# Patient Record
Sex: FEMALE | Race: WHITE | NOT HISPANIC OR LATINO | Employment: UNEMPLOYED | URBAN - METROPOLITAN AREA
[De-identification: names, ages, dates, MRNs, and addresses within clinical notes are randomized per-mention and may not be internally consistent; named-entity substitution may affect disease eponyms.]

---

## 2018-04-06 ENCOUNTER — HOSPITAL ENCOUNTER (EMERGENCY)
Facility: MEDICAL CENTER | Age: 28
End: 2018-04-06
Attending: EMERGENCY MEDICINE
Payer: MEDICAID

## 2018-04-06 ENCOUNTER — APPOINTMENT (OUTPATIENT)
Dept: RADIOLOGY | Facility: MEDICAL CENTER | Age: 28
End: 2018-04-06
Attending: EMERGENCY MEDICINE
Payer: MEDICAID

## 2018-04-06 VITALS
DIASTOLIC BLOOD PRESSURE: 61 MMHG | OXYGEN SATURATION: 97 % | HEART RATE: 85 BPM | HEIGHT: 66 IN | WEIGHT: 167.55 LBS | TEMPERATURE: 98 F | SYSTOLIC BLOOD PRESSURE: 100 MMHG | RESPIRATION RATE: 16 BRPM | BODY MASS INDEX: 26.93 KG/M2

## 2018-04-06 DIAGNOSIS — N30.00 ACUTE CYSTITIS WITHOUT HEMATURIA: ICD-10-CM

## 2018-04-06 LAB
ALBUMIN SERPL BCP-MCNC: 4.1 G/DL (ref 3.2–4.9)
ALBUMIN/GLOB SERPL: 1.5 G/DL
ALP SERPL-CCNC: 21 U/L (ref 30–99)
ALT SERPL-CCNC: 13 U/L (ref 2–50)
ANION GAP SERPL CALC-SCNC: 7 MMOL/L (ref 0–11.9)
APPEARANCE UR: CLEAR
AST SERPL-CCNC: 18 U/L (ref 12–45)
BACTERIA #/AREA URNS HPF: ABNORMAL /HPF
BASOPHILS # BLD AUTO: 0.7 % (ref 0–1.8)
BASOPHILS # BLD: 0.05 K/UL (ref 0–0.12)
BILIRUB SERPL-MCNC: 0.7 MG/DL (ref 0.1–1.5)
BILIRUB UR QL STRIP.AUTO: NEGATIVE
BUN SERPL-MCNC: 7 MG/DL (ref 8–22)
CALCIUM SERPL-MCNC: 9.3 MG/DL (ref 8.4–10.2)
CHLORIDE SERPL-SCNC: 102 MMOL/L (ref 96–112)
CO2 SERPL-SCNC: 23 MMOL/L (ref 20–33)
COLOR UR: YELLOW
CREAT SERPL-MCNC: 0.77 MG/DL (ref 0.5–1.4)
CULTURE IF INDICATED INDCX: YES UA CULTURE
EOSINOPHIL # BLD AUTO: 0.13 K/UL (ref 0–0.51)
EOSINOPHIL NFR BLD: 1.7 % (ref 0–6.9)
EPI CELLS #/AREA URNS HPF: ABNORMAL /HPF
ERYTHROCYTE [DISTWIDTH] IN BLOOD BY AUTOMATED COUNT: 33.1 FL (ref 35.9–50)
GLOBULIN SER CALC-MCNC: 2.7 G/DL (ref 1.9–3.5)
GLUCOSE SERPL-MCNC: 84 MG/DL (ref 65–99)
GLUCOSE UR STRIP.AUTO-MCNC: NEGATIVE MG/DL
HCT VFR BLD AUTO: 35.4 % (ref 37–47)
HGB BLD-MCNC: 11.5 G/DL (ref 12–16)
IMM GRANULOCYTES # BLD AUTO: 0.02 K/UL (ref 0–0.11)
IMM GRANULOCYTES NFR BLD AUTO: 0.3 % (ref 0–0.9)
KETONES UR STRIP.AUTO-MCNC: NEGATIVE MG/DL
LEUKOCYTE ESTERASE UR QL STRIP.AUTO: ABNORMAL
LIPASE SERPL-CCNC: 19 U/L (ref 7–58)
LYMPHOCYTES # BLD AUTO: 2.74 K/UL (ref 1–4.8)
LYMPHOCYTES NFR BLD: 36.5 % (ref 22–41)
MCH RBC QN AUTO: 20.7 PG (ref 27–33)
MCHC RBC AUTO-ENTMCNC: 32.5 G/DL (ref 33.6–35)
MCV RBC AUTO: 63.7 FL (ref 81.4–97.8)
MICRO URNS: ABNORMAL
MONOCYTES # BLD AUTO: 0.59 K/UL (ref 0–0.85)
MONOCYTES NFR BLD AUTO: 7.9 % (ref 0–13.4)
NEUTROPHILS # BLD AUTO: 3.98 K/UL (ref 2–7.15)
NEUTROPHILS NFR BLD: 52.9 % (ref 44–72)
NITRITE UR QL STRIP.AUTO: NEGATIVE
NRBC # BLD AUTO: 0 K/UL
NRBC BLD-RTO: 0 /100 WBC
PH UR STRIP.AUTO: 6 [PH]
PLATELET # BLD AUTO: 278 K/UL (ref 164–446)
PMV BLD AUTO: 10.6 FL (ref 9–12.9)
POTASSIUM SERPL-SCNC: 3.5 MMOL/L (ref 3.6–5.5)
PROT SERPL-MCNC: 6.8 G/DL (ref 6–8.2)
PROT UR QL STRIP: NEGATIVE MG/DL
RBC # BLD AUTO: 5.56 M/UL (ref 4.2–5.4)
RBC # URNS HPF: ABNORMAL /HPF
RBC UR QL AUTO: ABNORMAL
SODIUM SERPL-SCNC: 132 MMOL/L (ref 135–145)
SP GR UR STRIP.AUTO: <=1.005
WBC # BLD AUTO: 7.5 K/UL (ref 4.8–10.8)
WBC #/AREA URNS HPF: ABNORMAL /HPF

## 2018-04-06 PROCEDURE — 83690 ASSAY OF LIPASE: CPT

## 2018-04-06 PROCEDURE — 99284 EMERGENCY DEPT VISIT MOD MDM: CPT

## 2018-04-06 PROCEDURE — 76817 TRANSVAGINAL US OBSTETRIC: CPT

## 2018-04-06 PROCEDURE — 76705 ECHO EXAM OF ABDOMEN: CPT

## 2018-04-06 PROCEDURE — 700102 HCHG RX REV CODE 250 W/ 637 OVERRIDE(OP): Performed by: EMERGENCY MEDICINE

## 2018-04-06 PROCEDURE — A9270 NON-COVERED ITEM OR SERVICE: HCPCS | Performed by: EMERGENCY MEDICINE

## 2018-04-06 PROCEDURE — 87086 URINE CULTURE/COLONY COUNT: CPT

## 2018-04-06 PROCEDURE — 85025 COMPLETE CBC W/AUTO DIFF WBC: CPT

## 2018-04-06 PROCEDURE — 81001 URINALYSIS AUTO W/SCOPE: CPT

## 2018-04-06 PROCEDURE — 80053 COMPREHEN METABOLIC PANEL: CPT

## 2018-04-06 PROCEDURE — 76700 US EXAM ABDOM COMPLETE: CPT

## 2018-04-06 PROCEDURE — 36415 COLL VENOUS BLD VENIPUNCTURE: CPT

## 2018-04-06 RX ORDER — CEPHALEXIN 500 MG/1
500 CAPSULE ORAL 3 TIMES DAILY
Qty: 21 CAP | Refills: 0 | Status: SHIPPED | OUTPATIENT
Start: 2018-04-06 | End: 2018-04-13

## 2018-04-06 RX ORDER — CEPHALEXIN 250 MG/1
500 CAPSULE ORAL ONCE
Status: COMPLETED | OUTPATIENT
Start: 2018-04-06 | End: 2018-04-06

## 2018-04-06 RX ADMIN — CEPHALEXIN 500 MG: 250 CAPSULE ORAL at 19:26

## 2018-04-06 ASSESSMENT — PAIN SCALES - GENERAL: PAINLEVEL_OUTOF10: 4

## 2018-04-06 NOTE — ED NOTES
"Pt reports a hx of pregnancy for the past 10 weeks, and \"godwin-uterine blood clots\" prior to her pregnancy and on Lovenox twice per day since her diagnosis.  She presents complaining of RLQ abdominal pain and flank pain.   "

## 2018-04-06 NOTE — ED NOTES
Patient 9weeks 6days pregnant. Was seen two weeks ago in [place] and told he had a clot in her Periuterine vein. Placed on lovenox injections which she has been using. Today the rt lower/mid abd quadrant has as much pain as before and now she also has rt flank paon, no burning with urination. Denies bleeding.

## 2018-04-07 NOTE — ED PROVIDER NOTES
ED Provider Note    ED Provider Note      Primary care provider: No primary care provider on file.    CHIEF COMPLAINT  Chief Complaint   Patient presents with   • Pregnancy   • RLQ Pain   • Flank Pain       HPI  Francois Quintero is a 27 y.o. female who presents to the Emergency Department with chief complaint of right lower quadrant abdominal pain. Patient has recent history of periuterine vein thrombosis that she was diagnosed with by ultrasound 2 weeks prior at outside hospital. She had follow-up appointment with obstetrics and gynecology who performed MRI that failed to demonstrate the periuterine vein thrombosis. Since then she reports that her pain is gone slightly worse. Has not radiated into her right lower quadrant into the right flank. She currently rates it as a 6 out of 10 and states it's worse when she is up walking was also worse on the ride here tonight. She also reports that she had an abnormal bruise over the right lower quadrant of her abdomen previously states that this is lightening up now. After diagnosis of the periuterine vein thrombosis she was placed on Lovenox which is continue to take is hesitant to take this without firm diagnosis of the blood clot. She's had no abnormal vaginal bleeding or discharge no constipation or diarrhea no melena no hematochezia minor nausea no vomiting no fevers or chills no headache altered mental status cough congestion chest pain or shortness of breath.    REVIEW OF SYSTEMS  10 systems reviewed and otherwise negative, pertinent positives and negatives listed in the history of present illness.    PAST MEDICAL HISTORY     Periuterine vein thrombosis    SURGICAL HISTORY  patient denies any surgical history    SOCIAL HISTORY  Social History   Substance Use Topics   • Smoking status: Never Smoker   • Smokeless tobacco: Never Used   • Alcohol use No      History   Drug Use No       FAMILY HISTORY  Non-Contributory    CURRENT MEDICATIONS  Home Medications      "Reviewed by Dea Nicole PhT (Pharmacy Tech) on 04/06/18 at 1654  Med List Status: Complete   Medication Last Dose Status   enoxaparin (LOVENOX) 80 MG/0.8ML Solution inj 4/6/2018 Active   Prenatal Vit-Fe Fumarate-FA (PRENATAL PO) 4/6/2018 Active                ALLERGIES  No Known Allergies    PHYSICAL EXAM  VITAL SIGNS: /82   Pulse 80   Temp 36.7 °C (98 °F)   Resp 18   Ht 1.676 m (5' 6\") Comment: Stated  Wt 76 kg (167 lb 8.8 oz)   LMP 01/26/2018   SpO2 98%   BMI 27.04 kg/m²   Pulse ox interpretation: I interpret this pulse ox as normal.  Constitutional: Alert and oriented x 3, minimal Distress  HEENT: Atraumatic normocephalic, pupils are equal round reactive to light extraocular movements are intact. The nares is clear, external ears are normal, mouth shows moist mucous membranes  Neck: Supple, no JVD no tracheal deviation  Cardiovascular: Regular rate and rhythm no murmur rub or gallop 2+ pulses peripherally x4  Thorax & Lungs: No respiratory distress, no wheezes rales or rhonchi, No chest tenderness.   GI: Minimal tenderness in the right lower quadrant no rebound or guarding no other focal tenderness no right flank tenderness or CVA tenderness positive bowel sounds nondistended  : Deferred  Rectal: Deferred  Skin: Very faint ecchymosis over the right lower quadrant  Musculoskeletal: Moving all extremities with full range and 5 of 5 strength, no acute  deformity  Neurologic: Cranial nerves III through XII are grossly intact, no sensory deficit, no cerebellar dysfunction   Psychiatric: Appropriate affect for situation at this time      DIAGNOSTIC STUDIES / PROCEDURES  LABS      Results for orders placed or performed during the hospital encounter of 04/06/18   CBC WITH DIFFERENTIAL   Result Value Ref Range    WBC 7.5 4.8 - 10.8 K/uL    RBC 5.56 (H) 4.20 - 5.40 M/uL    Hemoglobin 11.5 (L) 12.0 - 16.0 g/dL    Hematocrit 35.4 (L) 37.0 - 47.0 %    MCV 63.7 (L) 81.4 - 97.8 fL    MCH 20.7 (L) 27.0 - 33.0 " pg    MCHC 32.5 (L) 33.6 - 35.0 g/dL    RDW 33.1 (L) 35.9 - 50.0 fL    Platelet Count 278 164 - 446 K/uL    MPV 10.6 9.0 - 12.9 fL    Neutrophils-Polys 52.90 44.00 - 72.00 %    Lymphocytes 36.50 22.00 - 41.00 %    Monocytes 7.90 0.00 - 13.40 %    Eosinophils 1.70 0.00 - 6.90 %    Basophils 0.70 0.00 - 1.80 %    Immature Granulocytes 0.30 0.00 - 0.90 %    Nucleated RBC 0.00 /100 WBC    Neutrophils (Absolute) 3.98 2.00 - 7.15 K/uL    Lymphs (Absolute) 2.74 1.00 - 4.80 K/uL    Monos (Absolute) 0.59 0.00 - 0.85 K/uL    Eos (Absolute) 0.13 0.00 - 0.51 K/uL    Baso (Absolute) 0.05 0.00 - 0.12 K/uL    Immature Granulocytes (abs) 0.02 0.00 - 0.11 K/uL    NRBC (Absolute) 0.00 K/uL   COMP METABOLIC PANEL   Result Value Ref Range    Sodium 132 (L) 135 - 145 mmol/L    Potassium 3.5 (L) 3.6 - 5.5 mmol/L    Chloride 102 96 - 112 mmol/L    Co2 23 20 - 33 mmol/L    Anion Gap 7.0 0.0 - 11.9    Glucose 84 65 - 99 mg/dL    Bun 7 (L) 8 - 22 mg/dL    Creatinine 0.77 0.50 - 1.40 mg/dL    Calcium 9.3 8.4 - 10.2 mg/dL    AST(SGOT) 18 12 - 45 U/L    ALT(SGPT) 13 2 - 50 U/L    Alkaline Phosphatase 21 (L) 30 - 99 U/L    Total Bilirubin 0.7 0.1 - 1.5 mg/dL    Albumin 4.1 3.2 - 4.9 g/dL    Total Protein 6.8 6.0 - 8.2 g/dL    Globulin 2.7 1.9 - 3.5 g/dL    A-G Ratio 1.5 g/dL   LIPASE   Result Value Ref Range    Lipase 19 7 - 58 U/L   URINALYSIS,CULTURE IF INDICATED   Result Value Ref Range    Color Yellow     Character Clear     Specific Gravity <=1.005 <1.035    Ph 6.0 5.0 - 8.0    Glucose Negative Negative mg/dL    Ketones Negative Negative mg/dL    Protein Negative Negative mg/dL    Bilirubin Negative Negative    Nitrite Negative Negative    Leukocyte Esterase Small (A) Negative    Occult Blood Trace (A) Negative    Micro Urine Req Microscopic     Culture Indicated Yes UA Culture   URINE MICROSCOPIC (W/UA)   Result Value Ref Range    WBC 5-10 (A) /hpf    RBC 2-5 (A) /hpf    Bacteria Moderate (A) None /hpf    Epithelial Cells Few Few /hpf    ESTIMATED GFR   Result Value Ref Range    GFR If African American >60 >60 mL/min/1.73 m 2    GFR If Non African American >60 >60 mL/min/1.73 m 2       All labs reviewed by me.        RADIOLOGY  US-OB PELVIS TRANSVAGINAL   Final Result      1.  10 week 1 day live intrauterine pregnancy      2.  Normal appearance of the ovaries      US-PELVIC LIMITED APPY   Final Result      The appendix is not delineated. Appendicitis cannot be excluded by this examination.   5.8 mm hyperechoic focus within the right lower quadrant subcutaneous soft tissues. Differential considerations include small lipoma or possibly small hematoma.      US-ABDOMEN COMPLETE SURVEY   Final Result      Unremarkable abdominal ultrasound.           The radiologist's interpretation of all radiological studies have been reviewed by me.    COURSE & MEDICAL DECISION MAKING  Pertinent Labs & Imaging studies reviewed. (See chart for details)    5:04 PM - Patient seen and examined at bedside.         Patient noted to have slightly elevated blood pressure likely circumstantial secondary to presenting complaint. Referred to primary care physician for further evaluation.        Medical Decision Making: Patient lives reassuring. She does have slight signs of infection in urine. Ultrasound of the abdomen and pelvis are unremarkable with the exception of slight hypoechoic area in the right lower quadrant subcutaneous tissue possible lipoma versus small hematoma. There is no evidence of periuterine thrombosis treatments he is intrauterine active. 10 weeks 1 day. Lab tests are reassuring. At this point patient instructed to continue taking Lovenox until further follow-up with obstetrics. She's given prescription for Keflex and given 1st dose here for urinary tract infection. Patient instructed to follow-up with obstetrics and gynecology at the next time to return here for worsening pain vaginal bleeding discharge fevers chills nausea vomiting or any other acute  "symptoms or concerns otherwise discharge home stable condition.    /82   Pulse 80   Temp 36.7 °C (98 °F)   Resp 18   Ht 1.676 m (5' 6\") Comment: Stated  Wt 76 kg (167 lb 8.8 oz)   LMP 01/26/2018   SpO2 98%   BMI 27.04 kg/m²      [facility]  [address]  [place] [id number] [phone]    As Scheduled    [facility], Emergency Dept  [address]  [place] [address] [phone]    immediately if symptoms worsen        FINAL IMPRESSION  1. Acute cystitis without hematuria     2. Abdominal pain  3. Probable small hematoma right lower quadrant  4. History of periuterine vein thrombosis on Lovenox    This dictation has been created using voice recognition software and/or scribes. The accuracy of the dictation is limited by the abilities of the software and the expertise of the scribes. I expect there may be some errors of grammar and possibly content. I made every attempt to manually correct the errors within my dictation. However, errors related to voice recognition software and/or scribes may still exist and should be interpreted within the appropriate context.            "

## 2018-04-07 NOTE — DISCHARGE INSTRUCTIONS
Abdominal Pain During Pregnancy  Abdominal pain is common in pregnancy. Most of the time, it does not cause harm. There are many causes of abdominal pain. Some causes are more serious than others and sometimes the cause is not known. Abdominal pain can be a sign that something is very wrong with the pregnancy or the pain may have nothing to do with the pregnancy. Always tell your health care provider if you have any abdominal pain.  Follow these instructions at home:  · Do not have sex or put anything in your vagina until your symptoms go away completely.  · Watch your abdominal pain for any changes.  · Get plenty of rest until your pain improves.  · Drink enough fluid to keep your urine clear or pale yellow.  · Take over-the-counter or prescription medicines only as told by your health care provider.  · Keep all follow-up visits as told by your health care provider. This is important.  Contact a health care provider if:  · You have a fever.  · Your pain gets worse or you have cramping.  · Your pain continues after resting.  Get help right away if:  · You are bleeding, leaking fluid, or passing tissue from the vagina.  · You have vomiting or diarrhea that does not go away.  · You have painful or bloody urination.  · You notice a decrease in your baby's movements.  · You feel very weak or faint.  · You have shortness of breath.  · You develop a severe headache with abdominal pain.  · You have abnormal vaginal discharge with abdominal pain.  This information is not intended to replace advice given to you by your health care provider. Make sure you discuss any questions you have with your health care provider.  Document Released: 12/18/2006 Document Revised: 09/28/2017 Document Reviewed: 07/17/2014  [employer] Interactive Patient Education © 2017 [employer]  Urinary Tract Infection, Adult  A urinary tract infection (UTI) is an infection of any part of the urinary tract, which includes the kidneys, ureters, bladder,  and urethra. These organs make, store, and get rid of urine in the body. UTI can be a bladder infection (cystitis) or kidney infection (pyelonephritis).  What are the causes?  This infection may be caused by fungi, viruses, or bacteria. Bacteria are the most common cause of UTIs. This condition can also be caused by repeated incomplete emptying of the bladder during urination.  What increases the risk?  This condition is more likely to develop if:  · You ignore your need to urinate or hold urine for long periods of time.  · You do not empty your bladder completely during urination.  · You wipe back to front after urinating or having a bowel movement, if you are female.  · You are uncircumcised, if you are male.  · You are constipated.  · You have a urinary catheter that stays in place (indwelling).  · You have a weak defense (immune) system.  · You have a medical condition that affects your bowels, kidneys, or bladder.  · You have diabetes.  · You take antibiotic medicines frequently or for long periods of time, and the antibiotics no longer work well against certain types of infections (antibiotic resistance).  · You take medicines that irritate your urinary tract.  · You are exposed to chemicals that irritate your urinary tract.  · You are female.  What are the signs or symptoms?  Symptoms of this condition include:  · Fever.  · Frequent urination or passing small amounts of urine frequently.  · Needing to urinate urgently.  · Pain or burning with urination.  · Urine that smells bad or unusual.  · Cloudy urine.  · Pain in the lower abdomen or back.  · Trouble urinating.  · Blood in the urine.  · Vomiting or being less hungry than normal.  · Diarrhea or abdominal pain.  · Vaginal discharge, if you are female.  How is this diagnosed?  This condition is diagnosed with a medical history and physical exam. You will also need to provide a urine sample to test your urine. Other tests may be done, including:  · Blood  tests.  · Sexually transmitted disease (STD) testing.  If you have had more than one UTI, a cystoscopy or imaging studies may be done to determine the cause of the infections.  How is this treated?  Treatment for this condition often includes a combination of two or more of the following:  · Antibiotic medicine.  · Other medicines to treat less common causes of UTI.  · Over-the-counter medicines to treat pain.  · Drinking enough water to stay hydrated.  Follow these instructions at home:  · Take over-the-counter and prescription medicines only as told by your health care provider.  · If you were prescribed an antibiotic, take it as told by your health care provider. Do not stop taking the antibiotic even if you start to feel better.  · Avoid alcohol, caffeine, tea, and carbonated beverages. They can irritate your bladder.  · Drink enough fluid to keep your urine clear or pale yellow.  · Keep all follow-up visits as told by your health care provider. This is important.  · Make sure to:  ¨ Empty your bladder often and completely. Do not hold urine for long periods of time.  ¨ Empty your bladder before and after sex.  ¨ Wipe from front to back after a bowel movement if you are female. Use each tissue one time when you wipe.  Contact a health care provider if:  · You have back pain.  · You have a fever.  · You feel nauseous or vomit.  · Your symptoms do not get better after 3 days.  · Your symptoms go away and then return.  Get help right away if:  · You have severe back pain or lower abdominal pain.  · You are vomiting and cannot keep down any medicines or water.  This information is not intended to replace advice given to you by your health care provider. Make sure you discuss any questions you have with your health care provider.  Document Released: 09/27/2006 Document Revised: 05/31/2017 Document Reviewed: 11/07/2016  [employer] Interactive Patient Education © 2017 [employer]

## 2018-04-08 LAB
BACTERIA UR CULT: NORMAL
SIGNIFICANT IND 70042: NORMAL
SITE SITE: NORMAL
SOURCE SOURCE: NORMAL

## 2018-05-08 ENCOUNTER — APPOINTMENT (OUTPATIENT)
Dept: RADIOLOGY | Facility: MEDICAL CENTER | Age: 28
End: 2018-05-08
Attending: EMERGENCY MEDICINE
Payer: MEDICAID

## 2018-05-08 ENCOUNTER — HOSPITAL ENCOUNTER (EMERGENCY)
Facility: MEDICAL CENTER | Age: 28
End: 2018-05-08
Attending: EMERGENCY MEDICINE
Payer: MEDICAID

## 2018-05-08 VITALS
HEIGHT: 66 IN | HEART RATE: 81 BPM | SYSTOLIC BLOOD PRESSURE: 110 MMHG | TEMPERATURE: 98 F | OXYGEN SATURATION: 97 % | RESPIRATION RATE: 18 BRPM | DIASTOLIC BLOOD PRESSURE: 73 MMHG | WEIGHT: 160.94 LBS | BODY MASS INDEX: 25.86 KG/M2

## 2018-05-08 DIAGNOSIS — R10.31 CHRONIC RLQ PAIN: ICD-10-CM

## 2018-05-08 DIAGNOSIS — G89.29 CHRONIC RLQ PAIN: ICD-10-CM

## 2018-05-08 LAB
ALBUMIN SERPL BCP-MCNC: 3.4 G/DL (ref 3.2–4.9)
ALBUMIN/GLOB SERPL: 1.1 G/DL
ALP SERPL-CCNC: 22 U/L (ref 30–99)
ALT SERPL-CCNC: 12 U/L (ref 2–50)
ANION GAP SERPL CALC-SCNC: 7 MMOL/L (ref 0–11.9)
APPEARANCE UR: CLEAR
AST SERPL-CCNC: 16 U/L (ref 12–45)
B-HCG SERPL-ACNC: ABNORMAL MIU/ML (ref 0–10)
BACTERIA #/AREA URNS HPF: ABNORMAL /HPF
BACTERIA GENITAL QL WET PREP: NORMAL
BASOPHILS # BLD AUTO: 0.5 % (ref 0–1.8)
BASOPHILS # BLD: 0.04 K/UL (ref 0–0.12)
BILIRUB SERPL-MCNC: 0.5 MG/DL (ref 0.1–1.5)
BILIRUB UR QL STRIP.AUTO: NEGATIVE
BUN SERPL-MCNC: 8 MG/DL (ref 8–22)
CALCIUM SERPL-MCNC: 9.2 MG/DL (ref 8.4–10.2)
CHLORIDE SERPL-SCNC: 103 MMOL/L (ref 96–112)
CO2 SERPL-SCNC: 23 MMOL/L (ref 20–33)
COLOR UR: YELLOW
CREAT SERPL-MCNC: 0.77 MG/DL (ref 0.5–1.4)
EOSINOPHIL # BLD AUTO: 0.15 K/UL (ref 0–0.51)
EOSINOPHIL NFR BLD: 1.8 % (ref 0–6.9)
EPI CELLS #/AREA URNS HPF: ABNORMAL /HPF
ERYTHROCYTE [DISTWIDTH] IN BLOOD BY AUTOMATED COUNT: 34.4 FL (ref 35.9–50)
GLOBULIN SER CALC-MCNC: 3 G/DL (ref 1.9–3.5)
GLUCOSE SERPL-MCNC: 104 MG/DL (ref 65–99)
GLUCOSE UR STRIP.AUTO-MCNC: NEGATIVE MG/DL
HCT VFR BLD AUTO: 35 % (ref 37–47)
HGB BLD-MCNC: 11.2 G/DL (ref 12–16)
IMM GRANULOCYTES # BLD AUTO: 0.03 K/UL (ref 0–0.11)
IMM GRANULOCYTES NFR BLD AUTO: 0.4 % (ref 0–0.9)
KETONES UR STRIP.AUTO-MCNC: >=80 MG/DL
LEUKOCYTE ESTERASE UR QL STRIP.AUTO: NEGATIVE
LYMPHOCYTES # BLD AUTO: 2.53 K/UL (ref 1–4.8)
LYMPHOCYTES NFR BLD: 30 % (ref 22–41)
MCH RBC QN AUTO: 20.7 PG (ref 27–33)
MCHC RBC AUTO-ENTMCNC: 32 G/DL (ref 33.6–35)
MCV RBC AUTO: 64.7 FL (ref 81.4–97.8)
MICRO URNS: ABNORMAL
MONOCYTES # BLD AUTO: 0.59 K/UL (ref 0–0.85)
MONOCYTES NFR BLD AUTO: 7 % (ref 0–13.4)
NEUTROPHILS # BLD AUTO: 5.08 K/UL (ref 2–7.15)
NEUTROPHILS NFR BLD: 60.3 % (ref 44–72)
NITRITE UR QL STRIP.AUTO: NEGATIVE
NRBC # BLD AUTO: 0 K/UL
NRBC BLD-RTO: 0 /100 WBC
NUMBER OF RH DOSES IND 8505RD: NORMAL
PH UR STRIP.AUTO: 5 [PH]
PLATELET # BLD AUTO: 250 K/UL (ref 164–446)
PMV BLD AUTO: 10.8 FL (ref 9–12.9)
POTASSIUM SERPL-SCNC: 3.2 MMOL/L (ref 3.6–5.5)
PROT SERPL-MCNC: 6.4 G/DL (ref 6–8.2)
PROT UR QL STRIP: NEGATIVE MG/DL
RBC # BLD AUTO: 5.41 M/UL (ref 4.2–5.4)
RBC # URNS HPF: ABNORMAL /HPF
RBC UR QL AUTO: ABNORMAL
RH BLD: NORMAL
SIGNIFICANT IND 70042: NORMAL
SITE SITE: NORMAL
SODIUM SERPL-SCNC: 133 MMOL/L (ref 135–145)
SOURCE SOURCE: NORMAL
SP GR UR REFRACTOMETRY: 1.02
WBC # BLD AUTO: 8.4 K/UL (ref 4.8–10.8)
WBC #/AREA URNS HPF: ABNORMAL /HPF

## 2018-05-08 PROCEDURE — 87491 CHLMYD TRACH DNA AMP PROBE: CPT

## 2018-05-08 PROCEDURE — 36415 COLL VENOUS BLD VENIPUNCTURE: CPT

## 2018-05-08 PROCEDURE — 85025 COMPLETE CBC W/AUTO DIFF WBC: CPT

## 2018-05-08 PROCEDURE — 99284 EMERGENCY DEPT VISIT MOD MDM: CPT

## 2018-05-08 PROCEDURE — 81001 URINALYSIS AUTO W/SCOPE: CPT

## 2018-05-08 PROCEDURE — 84702 CHORIONIC GONADOTROPIN TEST: CPT

## 2018-05-08 PROCEDURE — 80053 COMPREHEN METABOLIC PANEL: CPT

## 2018-05-08 PROCEDURE — 76815 OB US LIMITED FETUS(S): CPT

## 2018-05-08 PROCEDURE — 86901 BLOOD TYPING SEROLOGIC RH(D): CPT

## 2018-05-08 PROCEDURE — 87591 N.GONORRHOEAE DNA AMP PROB: CPT

## 2018-05-08 ASSESSMENT — ENCOUNTER SYMPTOMS
BACK PAIN: 0
DIARRHEA: 0
SHORTNESS OF BREATH: 0
VOMITING: 0
ABDOMINAL PAIN: 1
NAUSEA: 0
CHILLS: 0
FEVER: 0
SORE THROAT: 0
CONSTIPATION: 0
HEADACHES: 0

## 2018-05-08 ASSESSMENT — PAIN SCALES - GENERAL: PAINLEVEL_OUTOF10: 8

## 2018-05-08 NOTE — ED NOTES
Pt amb to triage c/o RLQ pain on and off since November. Pt is 14 weeks pregnant. Pt was recently treated for UTI. Pt had history of periuterine vein thrombosis and was placed on Lovenox. Pt denies any vaginal discharge.

## 2018-05-09 LAB
C TRACH DNA SPEC QL NAA+PROBE: NEGATIVE
N GONORRHOEA DNA SPEC QL NAA+PROBE: NEGATIVE
SPECIMEN SOURCE: NORMAL

## 2018-05-09 NOTE — ED PROVIDER NOTES
ED Provider Note    Means of arrival: Private vehicle  History obtained from: Patient  History limited by: None    CHIEF COMPLAINT  Chief Complaint   Patient presents with   • RLQ Pain   • Pregnancy       HPI  Francois Quintero is a 27 y.o. at 14 weeks pregnant by US who presents to the Emergency Department for RLQ pain. Patient reports that for the past 7 months of persistent right lower quadrant abdominal pain. She describes the pain as throbbing and moderate in severity. She had the pain prior to becoming pregnant and is unchanged in quality and severity since becoming pregnant. She reports that she was diagnosed with a godwin-uterine vein thrombosis and was previously on Lovenox, however repeat imaging demonstrated the blood clot resolved and she has been off Lovenox for the last 3 weeks. Patient reports that she has followed up with her gynecologist for this pain and no acute cause has been found for her right lower quadrant pain. She had an MRI done a few weeks ago which demonstrated no acute abnormalities. She was found to have bacturia by her gynecologist and was treated with a course of antibiotics which she just completed a couple of days ago. Patient reports despite this treatment she still has had persistent right lower quadrant pain. She reports that she came to the emergency department today as the cause of her pain has still not been figured out. Patient denies any fevers, chills, anorexia, nausea, vomiting, dysuria, and changes in bowel movements.    REVIEW OF SYSTEMS  Review of Systems   Constitutional: Negative for chills and fever.   HENT: Negative for sore throat.    Respiratory: Negative for shortness of breath.    Cardiovascular: Negative for chest pain.   Gastrointestinal: Positive for abdominal pain. Negative for constipation, diarrhea, nausea and vomiting.   Genitourinary: Negative for dysuria.   Musculoskeletal: Negative for back pain.   Neurological: Negative for headaches.   All  "other systems reviewed and are negative.      PAST MEDICAL HISTORY   none    SURGICAL HISTORY  patient denies any surgical history    SOCIAL HISTORY  Social History   Substance Use Topics   • Smoking status: Never Smoker   • Smokeless tobacco: Never Used   • Alcohol use No      History   Drug Use No       FAMILY HISTORY  No pertinent family history    CURRENT MEDICATIONS  Home Medications    none         ALLERGIES  No Known Allergies    PHYSICAL EXAM  VITAL SIGNS: /73   Pulse 81   Temp 36.7 °C (98 °F)   Resp 18   Ht 1.676 m (5' 6\")   Wt 73 kg (160 lb 15 oz)   SpO2 97%   BMI 25.98 kg/m²   Vitals reviewed by myself.  Physical Exam   Constitutional: She is well-developed, well-nourished, and in no distress. No distress.   HENT:   Head: Normocephalic.   Eyes: EOM are normal.   Neck: Normal range of motion.   Cardiovascular: Normal rate, regular rhythm and normal heart sounds.  Exam reveals no gallop and no friction rub.    No murmur heard.  Pulmonary/Chest: Effort normal and breath sounds normal. No respiratory distress. She has no wheezes. She has no rales.   Abdominal: Soft. Bowel sounds are normal. She exhibits no distension. There is no tenderness. There is no rebound.   Genitourinary:   Genitourinary Comments: Normal external genitalia. Cervical os is pink and nonfriable and closed. There is no discharge or bleeding from the cervical os.   Musculoskeletal: Normal range of motion.   Neurological: She is alert.   Skin: Skin is warm and dry.     DIAGNOSTIC STUDIES /  LABS  Labs Reviewed   URINALYSIS,CULTURE IF INDICATED - Abnormal; Notable for the following:        Result Value    Ketones >=80 (*)     Occult Blood Trace (*)     All other components within normal limits   CBC WITH DIFFERENTIAL - Abnormal; Notable for the following:     RBC 5.41 (*)     Hemoglobin 11.2 (*)     Hematocrit 35.0 (*)     MCV 64.7 (*)     MCH 20.7 (*)     MCHC 32.0 (*)     RDW 34.4 (*)     All other components within normal " limits   URINE MICROSCOPIC (W/UA) - Abnormal; Notable for the following:     RBC 2-5 (*)     Bacteria Rare (*)     All other components within normal limits   COMP METABOLIC PANEL - Abnormal; Notable for the following:     Sodium 133 (*)     Potassium 3.2 (*)     Glucose 104 (*)     Alkaline Phosphatase 22 (*)     All other components within normal limits   HCG QUANTITATIVE - Abnormal; Notable for the following:     Bhcg 89430.0 (*)     All other components within normal limits   REFRACTOMETER SG   RH TYPE FOR RHOGAM FROM E.D.    Narrative:     Print Consent?->No   WET PREP   CHLAMYDIA/GC PCR URINE OR SWAB   ESTIMATED GFR      All labs reviewed by me.    RADIOLOGY  US-OB LIMITED TRANSABDOMINAL   Final Result      1.  Single live intrauterine pregnancy of an estimated gestational age of 15w 3d with an estimated date of delivery of 10/27/2018. Fetal movement was noted during the exam.   2.  No abnormal fluid collection.           The radiologist's interpretation of all radiological studies have been reviewed by me.    COURSE & MEDICAL DECISION MAKING  Nursing notes, VS, PMSFHx reviewed in chart.    Patient is a 27-year-old female who comes in for right lower quadrant pain. Differential diagnosis includes cyst, torsion, umbilical vein clot, round ligament pain, normal pregnancy. I believe appendicitis, cholecystitis, cholelithiasis are unlikely as patient has had the pain for 7 months and recently had an MRI which was unremarkable. Therefore diagnostic workup here includes labs, urinalysis, and pelvic ultrasound.    Patient's initial vitals are within normal limits. Abdomen is soft, nontender, and benign. Patient declines pain medication. Labs returned unremarkable except for beta Quant that is elevated consistent with her current pregnancy. Rh is positive and therefore RhoGAM is not indicated. Pelvic swab is negative for yeast infection, Trichomonas, bacterial vaginosis. Urinalysis appears contaminated, there is trace  bacteria, however patient was recently treated for bacteria in her urine and therefore I do not believe repeat antibiotics are indicated. Ultrasound turns demonstrates no acute abnormalities. She is noted to have a single live intrauterine pregnancy with good fetal movement and fetal heart rate of 158. At this time I advised patient that I do not know the etiology of her chronic right lower quadrant pain. I advised her to follow-up with her gynecologist and possibly gastroenterologist. Patient is agreeable to this plan. She is then given strict return precautions and discharged home in stable condition with vitals in normal limits.    FINAL IMPRESSION  1. Chronic RLQ pain

## 2018-05-09 NOTE — ED NOTES
"Pt c/o right lower quadrant pain, reports hx of similar pain and was told \"there was a clot in my godwin uterine vein\" but has since been told it was resolved. Denies n/v/d. Area tender to palpation. Denies vag bleeding or discharge. Denies urinary s/sx. Reports had ultrasound last week and was told heart beat was good. Skin pwd. aa0x3. resp nonlabored.   "

## 2018-05-09 NOTE — DISCHARGE INSTRUCTIONS
Abdominal Pain (Nonspecific)  Your exam might not show the exact reason you have abdominal pain. Since there are many different causes of abdominal pain, another checkup and more tests may be needed. It is very important to follow up for lasting (persistent) or worsening symptoms. A possible cause of abdominal pain in any person who still has his or her appendix is acute appendicitis. Appendicitis is often hard to diagnose. Normal blood tests, urine tests, ultrasound, and CT scans do not completely rule out early appendicitis or other causes of abdominal pain. Sometimes, only the changes that happen over time will allow appendicitis and other causes of abdominal pain to be determined. Other potential problems that may require surgery may also take time to become more apparent. Because of this, it is important that you follow all of the instructions below.  HOME CARE INSTRUCTIONS   · Rest as much as possible.   · Do not eat solid food until your pain is gone.   · While adults or children have pain: A diet of water, weak decaffeinated tea, broth or bouillon, gelatin, oral rehydration solutions (ORS), frozen ice pops, or ice chips may be helpful.   · When pain is gone in adults or children: Start a light diet (dry toast, crackers, applesauce, or white rice). Increase the diet slowly as long as it does not bother you. Eat no dairy products (including cheese and eggs) and no spicy, fatty, fried, or high-fiber foods.   · Use no alcohol, caffeine, or cigarettes.   · Take your regular medicines unless your caregiver told you not to.   · Take any prescribed medicine as directed.   · Only take over-the-counter or prescription medicines for pain, discomfort, or fever as directed by your caregiver. Do not give aspirin to children.   If your caregiver has given you a follow-up appointment, it is very important to keep that appointment. Not keeping the appointment could result in a permanent injury and/or lasting (chronic) pain  and/or disability. If there is any problem keeping the appointment, you must call to reschedule.   SEEK IMMEDIATE MEDICAL CARE IF:   · Your pain is not gone in 24 hours.   · Your pain becomes worse, changes location, or feels different.   · You or your child has an oral temperature above 102° F (38.9° C), not controlled by medicine.   · Your baby is older than 3 months with a rectal temperature of 102° F (38.9° C) or higher.   · Your baby is 3 months old or younger with a rectal temperature of 100.4° F (38° C) or higher.   · You have shaking chills.   · You keep throwing up (vomiting) or cannot drink liquids.   · There is blood in your vomit or you see blood in your bowel movements.   · Your bowel movements become dark or black.   · You have frequent bowel movements.   · Your bowel movements stop (become blocked) or you cannot pass gas.   · You have bloody, frequent, or painful urination.   · You have yellow discoloration in the skin or whites of the eyes.   · Your stomach becomes bloated or bigger.   · You have dizziness or fainting.   · You have chest or back pain.   MAKE SURE YOU:   · Understand these instructions.   · Will watch your condition.   · Will get help right away if you are not doing well or get worse.   Document Released: 12/18/2006 Document Revised: 03/11/2013 Document Reviewed: 11/15/2010  [employer]® Patient Information ©2013 [employer].

## 2018-05-09 NOTE — ED NOTES
Pt provided po fluids, updated on plan of care. Denies any further needs. Will continue to monitor.

## 2018-05-16 ENCOUNTER — OFFICE VISIT (OUTPATIENT)
Dept: CARDIOLOGY | Facility: MEDICAL CENTER | Age: 28
End: 2018-05-16
Payer: MEDICAID

## 2018-05-16 VITALS
WEIGHT: 162 LBS | DIASTOLIC BLOOD PRESSURE: 68 MMHG | OXYGEN SATURATION: 98 % | BODY MASS INDEX: 26.03 KG/M2 | HEIGHT: 66 IN | SYSTOLIC BLOOD PRESSURE: 108 MMHG | HEART RATE: 92 BPM

## 2018-05-16 DIAGNOSIS — R00.2 PALPITATIONS: ICD-10-CM

## 2018-05-16 LAB — EKG IMPRESSION: NORMAL

## 2018-05-16 PROCEDURE — 93000 ELECTROCARDIOGRAM COMPLETE: CPT | Performed by: INTERNAL MEDICINE

## 2018-05-16 PROCEDURE — 99203 OFFICE O/P NEW LOW 30 MIN: CPT | Mod: 25 | Performed by: INTERNAL MEDICINE

## 2018-05-16 ASSESSMENT — ENCOUNTER SYMPTOMS
BLURRED VISION: 0
EYE DISCHARGE: 0
HEARTBURN: 0
CHILLS: 0
PND: 0
FEVER: 0
COUGH: 0
SHORTNESS OF BREATH: 0
BRUISES/BLEEDS EASILY: 0
DEPRESSION: 0
PALPITATIONS: 1
MYALGIAS: 0
DIZZINESS: 0
HEADACHES: 0
NERVOUS/ANXIOUS: 0
NAUSEA: 0

## 2018-05-16 NOTE — LETTER
[facility]   [address]   [address]  [place] [address]  Phone: [phone]  Fax: [phone]              Francois Quintero  1990    Encounter Date: 5/16/2018    Edd Abbott M.D.          PROGRESS NOTE:  Chief Complaint   Patient presents with   • Bradycardia       Subjective:   Francois Quintero is a 28 y.o. female who presents today in consultation at the request of Dr. Jalloh for palpitations and chest discomfort.  Of this lady is approaching her 2nd trimester of her 2nd pregnancy with a chief complaint of palpitations.  On rare occasion she will get a sensation of isolated palpitation associated with a jolting feeling retrosternally. Episodes of single palpitations may be present for several minutes but are not sustained. There is a very sick feeling of chest pressure that is always present during the time of palpitations with accentuation of the feeling during and immediately after premature beat. No associated symptoms. Her health is otherwise good. She had no significant palpitations or problems with her 1st pregnancy.  She has chronic right lower quadrant discomfort that has resulted in 2 visits to the emergency room with no clear-cut diagnosis.    Past history remarkable for good health.    Family history: Unspecified heart disease on her mother's side.    Social history: No tobacco no drug use. She is  with a healthy daughter    Office EKG demonstrates sinus rhythm with borderline short MA interval, otherwise normal    History reviewed. No pertinent past medical history.  History reviewed. No pertinent surgical history.  History reviewed. No pertinent family history.  Social History     Social History   • Marital status: Unknown     Spouse name: N/A   • Number of children: N/A   • Years of education: N/A     Occupational History   • Not on file.     Social History Main Topics   • Smoking status: Never  "Smoker   • Smokeless tobacco: Never Used   • Alcohol use No   • Drug use: No   • Sexual activity: Not on file     Other Topics Concern   • Not on file     Social History Narrative   • No narrative on file     No Known Allergies  Outpatient Encounter Prescriptions as of 5/16/2018   Medication Sig Dispense Refill   • Prenatal Vit-Fe Fumarate-FA (PRENATAL PO) Take 1 Tab by mouth every day.     • enoxaparin (LOVENOX) 80 MG/0.8ML Solution inj Inject 1 Syringe as instructed every 12 hours.       No facility-administered encounter medications on file as of 5/16/2018.      Review of Systems   Constitutional: Negative for chills, fever and malaise/fatigue.   Eyes: Negative for blurred vision and discharge.   Respiratory: Negative for cough and shortness of breath.    Cardiovascular: Positive for palpitations. Negative for chest pain, leg swelling and PND.   Gastrointestinal: Negative for heartburn and nausea.   Genitourinary: Negative for dysuria and urgency.   Musculoskeletal: Negative for myalgias.   Skin: Negative for itching and rash.   Neurological: Negative for dizziness and headaches.   Endo/Heme/Allergies: Negative for environmental allergies. Does not bruise/bleed easily.   Psychiatric/Behavioral: Negative for depression. The patient is not nervous/anxious.         Objective:   /68   Pulse 92   Ht 1.676 m (5' 6\")   Wt 73.5 kg (162 lb)   SpO2 98%   BMI 26.15 kg/m²      Physical Exam   Constitutional: She is oriented to person, place, and time. She appears well-developed and well-nourished.   Neck: No JVD present.   Cardiovascular: Normal rate and regular rhythm.  Exam reveals no gallop and no friction rub.    No murmur heard.  Pulmonary/Chest: Effort normal and breath sounds normal.   Neurological: She is alert and oriented to person, place, and time.       Assessment:     1. Palpitations  ECHOCARDIOGRAM COMP W/O CONT       Medical Decision Making:  Today's Assessment / Status / Plan:   Her description of " palpitations and associated chest discomfort are very likely benign premature beats.  We will obtain an echocardiogram  No indication for stress test for long-term monitoring  Did give her information on a cell phone vane for purposes of intermittent heart monitoring.  She is to return if episodes become more frequent or sustained.  If echo is normal we will let her know but no need for return  Thank you for this consult      Alison Jalloh M.D.  [address] [address]  VIA Facsimile: [phone]

## 2018-05-16 NOTE — PROGRESS NOTES
Chief Complaint   Patient presents with   • Bradycardia       Subjective:   Francois Quintero is a 28 y.o. female who presents today in consultation at the request of Dr. Jalloh for palpitations and chest discomfort.  Of this lady is approaching her 2nd trimester of her 2nd pregnancy with a chief complaint of palpitations.  On rare occasion she will get a sensation of isolated palpitation associated with a jolting feeling retrosternally. Episodes of single palpitations may be present for several minutes but are not sustained. There is a very sick feeling of chest pressure that is always present during the time of palpitations with accentuation of the feeling during and immediately after premature beat. No associated symptoms. Her health is otherwise good. She had no significant palpitations or problems with her 1st pregnancy.  She has chronic right lower quadrant discomfort that has resulted in 2 visits to the emergency room with no clear-cut diagnosis.    Past history remarkable for good health.    Family history: Unspecified heart disease on her mother's side.    Social history: No tobacco no drug use. She is  with a healthy daughter    Office EKG demonstrates sinus rhythm with borderline short VA interval, otherwise normal    History reviewed. No pertinent past medical history.  History reviewed. No pertinent surgical history.  History reviewed. No pertinent family history.  Social History     Social History   • Marital status: Unknown     Spouse name: N/A   • Number of children: N/A   • Years of education: N/A     Occupational History   • Not on file.     Social History Main Topics   • Smoking status: Never Smoker   • Smokeless tobacco: Never Used   • Alcohol use No   • Drug use: No   • Sexual activity: Not on file     Other Topics Concern   • Not on file     Social History Narrative   • No narrative on file     No Known Allergies  Outpatient Encounter Prescriptions as of 5/16/2018   Medication Sig  "Dispense Refill   • Prenatal Vit-Fe Fumarate-FA (PRENATAL PO) Take 1 Tab by mouth every day.     • enoxaparin (LOVENOX) 80 MG/0.8ML Solution inj Inject 1 Syringe as instructed every 12 hours.       No facility-administered encounter medications on file as of 5/16/2018.      Review of Systems   Constitutional: Negative for chills, fever and malaise/fatigue.   Eyes: Negative for blurred vision and discharge.   Respiratory: Negative for cough and shortness of breath.    Cardiovascular: Positive for palpitations. Negative for chest pain, leg swelling and PND.   Gastrointestinal: Negative for heartburn and nausea.   Genitourinary: Negative for dysuria and urgency.   Musculoskeletal: Negative for myalgias.   Skin: Negative for itching and rash.   Neurological: Negative for dizziness and headaches.   Endo/Heme/Allergies: Negative for environmental allergies. Does not bruise/bleed easily.   Psychiatric/Behavioral: Negative for depression. The patient is not nervous/anxious.         Objective:   /68   Pulse 92   Ht 1.676 m (5' 6\")   Wt 73.5 kg (162 lb)   SpO2 98%   BMI 26.15 kg/m²     Physical Exam   Constitutional: She is oriented to person, place, and time. She appears well-developed and well-nourished.   Neck: No JVD present.   Cardiovascular: Normal rate and regular rhythm.  Exam reveals no gallop and no friction rub.    No murmur heard.  Pulmonary/Chest: Effort normal and breath sounds normal.   Neurological: She is alert and oriented to person, place, and time.       Assessment:     1. Palpitations  ECHOCARDIOGRAM COMP W/O CONT       Medical Decision Making:  Today's Assessment / Status / Plan:   Her description of palpitations and associated chest discomfort are very likely benign premature beats.  We will obtain an echocardiogram  No indication for stress test for long-term monitoring  Did give her information on a cell phone vane for purposes of intermittent heart monitoring.  She is to return if episodes " become more frequent or sustained.  If echo is normal we will let her know but no need for return  Thank you for this consult

## 2018-05-23 ENCOUNTER — OFFICE VISIT (OUTPATIENT)
Dept: MEDICAL GROUP | Facility: MEDICAL CENTER | Age: 28
End: 2018-05-23
Attending: NURSE PRACTITIONER
Payer: MEDICAID

## 2018-05-23 VITALS
HEART RATE: 76 BPM | SYSTOLIC BLOOD PRESSURE: 100 MMHG | DIASTOLIC BLOOD PRESSURE: 60 MMHG | WEIGHT: 160 LBS | HEIGHT: 66 IN | RESPIRATION RATE: 16 BRPM | TEMPERATURE: 97.8 F | BODY MASS INDEX: 25.71 KG/M2 | OXYGEN SATURATION: 96 %

## 2018-05-23 DIAGNOSIS — Z00.00 ROUTINE HEALTH MAINTENANCE: ICD-10-CM

## 2018-05-23 DIAGNOSIS — R10.31 RIGHT LOWER QUADRANT ABDOMINAL PAIN: ICD-10-CM

## 2018-05-23 DIAGNOSIS — Z3A.17 17 WEEKS GESTATION OF PREGNANCY: ICD-10-CM

## 2018-05-23 PROCEDURE — 99214 OFFICE O/P EST MOD 30 MIN: CPT | Performed by: NURSE PRACTITIONER

## 2018-05-23 PROCEDURE — 99203 OFFICE O/P NEW LOW 30 MIN: CPT | Performed by: NURSE PRACTITIONER

## 2018-05-23 NOTE — ASSESSMENT & PLAN NOTE
"Pt has hx of RLQ abd pain for over 7 months, onset prior to current pregnancy  EDC 10/27/18. Has had Recent Gall bladder U/s and was normal.    Had MRI of Abd ~ April and was normal. Pt reports was told by [facility] that had uterine vein clot but OB had her have MRI and did not have clot  And stopped Lovenox.    States some days mild and some days \"unbearable\" and pain is \"ross dull pain.  BM's every few days and small in nature. No dark or bloody stools  Hx of constipation.   "

## 2018-05-23 NOTE — PROGRESS NOTES
"Chief Complaint:   Chief Complaint   Patient presents with   • Abdominal Pain       HPI:  Francois is here today to establish as a new patient.     Her PMH includes  Chronic RLQ abd pain ( 7 months)  Pregnancy(17 weeks)  Sharon-Uterine Vein Thrombus        Nev  Report: No Entries    Review of Records:  5/16/18 Cardiology Appt w Dr Abbott r/t Palpitations and Chest pressure, pregnancy, chronic RLQ abd pain. Order for ECHO    5/8/18 ER visit for RLQ Abd pain x 7 months, Pregnant 15 weeks, Hc of Sharon-Uterine Vein Thrombosis/ On Lovenox  Recent MRI ABd/Pelvis Negative. Transvag Abd/Pelvic U/S --> 15 week pregnancy, no acute findings.    4/6/18 ER visit for RLQ Abd pain U/S and Labs normal. Possible UTI . RX for Keflex. To see OB/GYN    Right lower quadrant abdominal pain  Pt has hx of RLQ abd pain for over 7 months, onset prior to current pregnancy  EDC 10/27/18. Has had Recent Gall bladder U/s and was normal.    Had MRI of Abd ~ April and was normal. Pt reports was told by [facility] that had uterine vein clot but OB had her have MRI and did not have clot  And stopped Lovenox.    States some days mild and some days \"unbearable\" and pain is \"ross dull pain.  BM's every few days and small in nature. No dark or bloody stools  Hx of constipation. Recently no problems w stools.     Pt denies diarrhea or n/v or flank pain. Denies dysuria.  Pt asking for IV antibiotics to prevent Appendicitis as she believes she has 'chronic appendicitis'.  Discussed antibiotics not warranted at this time.  If in future for pre-op or Appy concern some advise Cefazolin and Flagyl as long as past 1st trimester  If indicated.    17 weeks gestation of pregnancy  Seeing DR Shubham ORONA and has next appt 6/6/18  REcent Trans-vaginal Pelvic u/s shows fetal movement and 17 wk gestation and no abnormalities  Encouraged her to see OB GYN by next week for f/u.      Patient Active Problem List    Diagnosis Date Noted   • Routine health " "maintenance 05/23/2018   • 17 weeks gestation of pregnancy 05/23/2018   • Right lower quadrant abdominal pain 05/23/2018       Allergies:Patient has no known allergies.    Current Outpatient Prescriptions   Medication Sig Dispense Refill   • Prenatal Vit-Fe Fumarate-FA (PRENATAL PO) Take 1 Tab by mouth every day.       No current facility-administered medications for this visit.        Social History   Substance Use Topics   • Smoking status: Never Smoker   • Smokeless tobacco: Never Used   • Alcohol use No       History reviewed. No pertinent past medical history.    History reviewed. No pertinent family history.    ROS:  Review of Systems   See HPI Above    Exam:  Blood pressure 100/60, pulse 76, temperature 36.6 °C (97.8 °F), resp. rate 16, height 1.676 m (5' 6\"), weight 72.6 kg (160 lb), SpO2 96 %. Body mass index is 25.82 kg/m².    General:  Well nourished, well developed female in NAD  HENT:Head is grossly normal. PERRL.  Neck: Supple. Trachea is midline.  Pulmonary: Clear to ausculation .  Normal effort. No rales, ronchi, or wheezing.   Cardiovascular: Regular rate and rhythm.  Abdomen-Abdomen is soft, No tenderness except mild to RLQ. No guarding.  Upper extremities- Strong = . Good ROM  Lower extremities- neg for edema, redness, tenderness.  Neuro- A & O x 4. Speech clear and appropriate.    Current medications, allergies, and problem list reviewed with patient and updated in [facility] today.    Assessment/Plan:  1. Routine health maintenance  Pt to return for basic preventative health issues in near future   2. 17 weeks gestation of pregnancy  f/u OB GYN as discussed. Continue Prenatal vits   3. Right lower quadrant abdominal pain  REFERRAL TO GASTROENTEROLOGY(Urgent)    REFERRAL TO GENERAL SURGERY for further eval       Return in about 2 weeks (around 6/6/2018) for f/u on chronic RLQ abd pain.  "

## 2018-05-28 ENCOUNTER — HOSPITAL ENCOUNTER (OUTPATIENT)
Dept: CARDIOLOGY | Facility: MEDICAL CENTER | Age: 28
End: 2018-05-28
Attending: INTERNAL MEDICINE
Payer: MEDICAID

## 2018-05-28 DIAGNOSIS — R00.2 PALPITATIONS: ICD-10-CM

## 2018-05-28 PROCEDURE — 93306 TTE W/DOPPLER COMPLETE: CPT

## 2018-05-28 PROCEDURE — 93306 TTE W/DOPPLER COMPLETE: CPT | Mod: 26 | Performed by: INTERNAL MEDICINE

## 2018-05-29 LAB
LV EJECT FRACT  99904: 65
LV EJECT FRACT MOD 2C 99903: 60.55
LV EJECT FRACT MOD 4C 99902: 64.41
LV EJECT FRACT MOD BP 99901: 64.35

## 2018-06-05 ENCOUNTER — TELEPHONE (OUTPATIENT)
Dept: CARDIOLOGY | Facility: MEDICAL CENTER | Age: 28
End: 2018-06-05

## 2018-06-05 NOTE — TELEPHONE ENCOUNTER
"Pt reports palpitations are the same. No better or worse since 5/16 FV. Still occur randomly - no pattern. Usually accompanied by \"throat tightness\". Both day & sometimes middle of night , waking her up. To Dr. Abbott  "

## 2018-06-05 NOTE — TELEPHONE ENCOUNTER
----- Message from Edd Abbott M.D. sent at 6/4/2018  5:45 PM PDT -----  Good news.  Echo normal.  How are palpitations?

## 2018-06-06 DIAGNOSIS — R00.2 PALPITATIONS: ICD-10-CM

## 2018-06-06 NOTE — TELEPHONE ENCOUNTER
She was informed and agrees to the holter.  Will call back tomorrow if she does not hear from scheduling.

## 2018-06-14 DIAGNOSIS — R00.2 PALPITATIONS: ICD-10-CM

## 2018-06-15 ENCOUNTER — TELEPHONE (OUTPATIENT)
Dept: CARDIOLOGY | Facility: MEDICAL CENTER | Age: 28
End: 2018-06-15

## 2018-06-15 NOTE — TELEPHONE ENCOUNTER
Patient enrolled in the [employer] program.  >Pending Baseline.  Notify patient he was enrolled into the  [employer] program  Once she  is receives it , she  will call the company to active the monitor

## 2018-06-21 ENCOUNTER — NON-PROVIDER VISIT (OUTPATIENT)
Dept: CARDIOLOGY | Facility: MEDICAL CENTER | Age: 28
End: 2018-06-21
Attending: INTERNAL MEDICINE
Payer: MEDICAID

## 2018-06-21 DIAGNOSIS — R00.2 PALPITATIONS: ICD-10-CM

## 2018-06-21 DIAGNOSIS — I49.3 PVC (PREMATURE VENTRICULAR CONTRACTION): ICD-10-CM

## 2018-06-21 DIAGNOSIS — I47.20 VT (VENTRICULAR TACHYCARDIA) (HCC): ICD-10-CM

## 2018-06-21 DIAGNOSIS — I44.2 AIVR (ACCELERATED IDIOVENTRICULAR RHYTHM) (HCC): ICD-10-CM

## 2018-07-20 ENCOUNTER — TELEPHONE (OUTPATIENT)
Dept: CARDIOLOGY | Facility: MEDICAL CENTER | Age: 28
End: 2018-07-20

## 2018-07-20 NOTE — TELEPHONE ENCOUNTER
Pt came in to discuss her event recorder results with SC in clinic today but results were not available yet. After appointment today w/ Dr. Abbott reviewed results and stated the following per Marion KENNEDY:     - give very strong reassurance to the patient  - symptoms triggered were related to isolated VE episodes  - beta-blocker would not be appropriate d/t accelerated idioventricular events    Additional comments after discussing Dr. Abbott's recommendations with SC: per SC  - try vasovagal maneuvers when symptomatic  - EP consult if symptoms are causing decrease in quality of life    Called pt and notified her of the above recommendations. Discussed vasovagal maneuvers. She appreciated the reassurance and will call if symptoms worsen.

## 2018-07-25 PROCEDURE — 93224 XTRNL ECG REC UP TO 48 HRS: CPT | Performed by: INTERNAL MEDICINE

## 2018-07-26 ENCOUNTER — TELEPHONE (OUTPATIENT)
Dept: CARDIOLOGY | Facility: MEDICAL CENTER | Age: 28
End: 2018-07-26

## 2018-08-04 ENCOUNTER — APPOINTMENT (OUTPATIENT)
Dept: RADIOLOGY | Facility: MEDICAL CENTER | Age: 28
End: 2018-08-04
Attending: OBSTETRICS & GYNECOLOGY
Payer: MEDICAID

## 2018-08-04 ENCOUNTER — HOSPITAL ENCOUNTER (OUTPATIENT)
Facility: MEDICAL CENTER | Age: 28
End: 2018-08-04
Attending: OBSTETRICS & GYNECOLOGY | Admitting: OBSTETRICS & GYNECOLOGY
Payer: MEDICAID

## 2018-08-04 VITALS
DIASTOLIC BLOOD PRESSURE: 66 MMHG | TEMPERATURE: 99.5 F | WEIGHT: 166 LBS | HEIGHT: 66 IN | BODY MASS INDEX: 26.68 KG/M2 | RESPIRATION RATE: 18 BRPM | HEART RATE: 80 BPM | SYSTOLIC BLOOD PRESSURE: 106 MMHG

## 2018-08-04 PROCEDURE — 76705 ECHO EXAM OF ABDOMEN: CPT

## 2018-08-05 NOTE — PROGRESS NOTES
", EDC: , 27.1 weeks  Pt presents to L&D with c/o right-sided pain that is constant, dull, and worse with touch. Pt states the pain has increased over the last 4 days. Pt has a barely visible, dime-sized bluish discoloration to the right mid abdomen. Pt reports that she was seen at [facility] back in March for the same pain and they diagnosed her with a \"clot in my periuterine vein.\" Pt brought records with her that states thrombophlebitis of right periuterine vein identified and a very small perigestational hemorrage. Pt denies LOF, VB, cramping, +FM. EFM/[place] placed. VSS. FOB and daughter at bedside.  : Dr. Zhong notified of pt. Orders received for an abdominal u/s. POC discussed with pt.  : U/S tech at bedside.  : Report given to DEBORA Yap RN. POC discussed.  "

## 2018-08-10 NOTE — PROGRESS NOTES
1900 Report received from Marion GUERRA RN, POC discussed. Awaiting for US report.     1950 US report completed, Dr. Zhong updated on pt status, orders for discharge received.     1952 RN at bedside, POC discussed. Pt has an appointment with Dr. Jalloh on Monday, pt encouraged to discuss further concerns about the US results if she has any come up over the weekend. PT educated that she can use tylenol and a heat pack/pad to help with discomfort. All questions answered. Pt discharged home in stable condition.    English

## 2018-10-24 ENCOUNTER — HOSPITAL ENCOUNTER (INPATIENT)
Facility: MEDICAL CENTER | Age: 28
LOS: 1 days | End: 2018-10-25
Attending: OBSTETRICS & GYNECOLOGY | Admitting: OBSTETRICS & GYNECOLOGY
Payer: MEDICAID

## 2018-10-24 LAB
BASOPHILS # BLD AUTO: 0.6 % (ref 0–1.8)
BASOPHILS # BLD: 0.08 K/UL (ref 0–0.12)
EOSINOPHIL # BLD AUTO: 0.35 K/UL (ref 0–0.51)
EOSINOPHIL NFR BLD: 2.7 % (ref 0–6.9)
ERYTHROCYTE [DISTWIDTH] IN BLOOD BY AUTOMATED COUNT: 36.1 FL (ref 35.9–50)
ERYTHROCYTE [DISTWIDTH] IN BLOOD BY AUTOMATED COUNT: 36.3 FL (ref 35.9–50)
HCT VFR BLD AUTO: 33.5 % (ref 37–47)
HCT VFR BLD AUTO: 39.8 % (ref 37–47)
HGB BLD-MCNC: 10.5 G/DL (ref 12–16)
HGB BLD-MCNC: 12.3 G/DL (ref 12–16)
HOLDING TUBE BB 8507: NORMAL
IMM GRANULOCYTES # BLD AUTO: 0.11 K/UL (ref 0–0.11)
IMM GRANULOCYTES NFR BLD AUTO: 0.9 % (ref 0–0.9)
LYMPHOCYTES # BLD AUTO: 2.66 K/UL (ref 1–4.8)
LYMPHOCYTES NFR BLD: 20.7 % (ref 22–41)
MCH RBC QN AUTO: 21.7 PG (ref 27–33)
MCH RBC QN AUTO: 21.7 PG (ref 27–33)
MCHC RBC AUTO-ENTMCNC: 30.9 G/DL (ref 33.6–35)
MCHC RBC AUTO-ENTMCNC: 31.3 G/DL (ref 33.6–35)
MCV RBC AUTO: 69.2 FL (ref 81.4–97.8)
MCV RBC AUTO: 70.1 FL (ref 81.4–97.8)
MONOCYTES # BLD AUTO: 0.74 K/UL (ref 0–0.85)
MONOCYTES NFR BLD AUTO: 5.7 % (ref 0–13.4)
NEUTROPHILS # BLD AUTO: 8.93 K/UL (ref 2–7.15)
NEUTROPHILS NFR BLD: 69.4 % (ref 44–72)
NRBC # BLD AUTO: 0.02 K/UL
NRBC BLD-RTO: 0.2 /100 WBC
PLATELET # BLD AUTO: 193 K/UL (ref 164–446)
PLATELET # BLD AUTO: 241 K/UL (ref 164–446)
PMV BLD AUTO: 10.5 FL (ref 9–12.9)
PMV BLD AUTO: 10.5 FL (ref 9–12.9)
RBC # BLD AUTO: 4.84 M/UL (ref 4.2–5.4)
RBC # BLD AUTO: 5.68 M/UL (ref 4.2–5.4)
WBC # BLD AUTO: 12.9 K/UL (ref 4.8–10.8)
WBC # BLD AUTO: 15.2 K/UL (ref 4.8–10.8)

## 2018-10-24 PROCEDURE — 85027 COMPLETE CBC AUTOMATED: CPT

## 2018-10-24 PROCEDURE — 700102 HCHG RX REV CODE 250 W/ 637 OVERRIDE(OP): Performed by: OBSTETRICS & GYNECOLOGY

## 2018-10-24 PROCEDURE — A9270 NON-COVERED ITEM OR SERVICE: HCPCS | Performed by: OBSTETRICS & GYNECOLOGY

## 2018-10-24 PROCEDURE — 770002 HCHG ROOM/CARE - OB PRIVATE (112)

## 2018-10-24 PROCEDURE — 700111 HCHG RX REV CODE 636 W/ 250 OVERRIDE (IP)

## 2018-10-24 PROCEDURE — 0KQM0ZZ REPAIR PERINEUM MUSCLE, OPEN APPROACH: ICD-10-PCS | Performed by: OBSTETRICS & GYNECOLOGY

## 2018-10-24 PROCEDURE — 85025 COMPLETE CBC W/AUTO DIFF WBC: CPT

## 2018-10-24 PROCEDURE — 36415 COLL VENOUS BLD VENIPUNCTURE: CPT

## 2018-10-24 PROCEDURE — 700112 HCHG RX REV CODE 229: Performed by: OBSTETRICS & GYNECOLOGY

## 2018-10-24 PROCEDURE — 700105 HCHG RX REV CODE 258

## 2018-10-24 RX ORDER — DOCUSATE SODIUM 100 MG/1
100 CAPSULE, LIQUID FILLED ORAL 2 TIMES DAILY PRN
Status: DISCONTINUED | OUTPATIENT
Start: 2018-10-24 | End: 2018-10-25 | Stop reason: HOSPADM

## 2018-10-24 RX ORDER — LIDOCAINE HYDROCHLORIDE 10 MG/ML
INJECTION, SOLUTION EPIDURAL; INFILTRATION; INTRACAUDAL; PERINEURAL
Status: COMPLETED
Start: 2018-10-24 | End: 2018-10-24

## 2018-10-24 RX ORDER — OXYCODONE HYDROCHLORIDE AND ACETAMINOPHEN 5; 325 MG/1; MG/1
1 TABLET ORAL EVERY 4 HOURS PRN
Status: DISCONTINUED | OUTPATIENT
Start: 2018-10-24 | End: 2018-10-25 | Stop reason: HOSPADM

## 2018-10-24 RX ORDER — SODIUM CHLORIDE, SODIUM LACTATE, POTASSIUM CHLORIDE, CALCIUM CHLORIDE 600; 310; 30; 20 MG/100ML; MG/100ML; MG/100ML; MG/100ML
INJECTION, SOLUTION INTRAVENOUS
Status: COMPLETED
Start: 2018-10-24 | End: 2018-10-24

## 2018-10-24 RX ORDER — IBUPROFEN 600 MG/1
600 TABLET ORAL EVERY 6 HOURS PRN
Status: DISCONTINUED | OUTPATIENT
Start: 2018-10-24 | End: 2018-10-25 | Stop reason: HOSPADM

## 2018-10-24 RX ORDER — VITAMIN A ACETATE, BETA CAROTENE, ASCORBIC ACID, CHOLECALCIFEROL, .ALPHA.-TOCOPHEROL ACETATE, DL-, THIAMINE MONONITRATE, RIBOFLAVIN, NIACINAMIDE, PYRIDOXINE HYDROCHLORIDE, FOLIC ACID, CYANOCOBALAMIN, CALCIUM CARBONATE, FERROUS FUMARATE, ZINC OXIDE, CUPRIC OXIDE 3080; 12; 120; 400; 1; 1.84; 3; 20; 22; 920; 25; 200; 27; 10; 2 [IU]/1; UG/1; MG/1; [IU]/1; MG/1; MG/1; MG/1; MG/1; MG/1; [IU]/1; MG/1; MG/1; MG/1; MG/1; MG/1
1 TABLET, FILM COATED ORAL EVERY MORNING
Status: DISCONTINUED | OUTPATIENT
Start: 2018-10-24 | End: 2018-10-25 | Stop reason: HOSPADM

## 2018-10-24 RX ORDER — ROPIVACAINE HYDROCHLORIDE 2 MG/ML
INJECTION, SOLUTION EPIDURAL; INFILTRATION; PERINEURAL
Status: COMPLETED
Start: 2018-10-24 | End: 2018-10-24

## 2018-10-24 RX ORDER — SODIUM CHLORIDE, SODIUM LACTATE, POTASSIUM CHLORIDE, CALCIUM CHLORIDE 600; 310; 30; 20 MG/100ML; MG/100ML; MG/100ML; MG/100ML
INJECTION, SOLUTION INTRAVENOUS PRN
Status: DISCONTINUED | OUTPATIENT
Start: 2018-10-24 | End: 2018-10-25 | Stop reason: HOSPADM

## 2018-10-24 RX ORDER — MISOPROSTOL 200 UG/1
800 TABLET ORAL
Status: DISCONTINUED | OUTPATIENT
Start: 2018-10-24 | End: 2018-10-24 | Stop reason: HOSPADM

## 2018-10-24 RX ORDER — MISOPROSTOL 200 UG/1
600 TABLET ORAL
Status: DISCONTINUED | OUTPATIENT
Start: 2018-10-24 | End: 2018-10-25 | Stop reason: HOSPADM

## 2018-10-24 RX ADMIN — SODIUM CHLORIDE, POTASSIUM CHLORIDE, SODIUM LACTATE AND CALCIUM CHLORIDE 1000 ML: 600; 310; 30; 20 INJECTION, SOLUTION INTRAVENOUS at 10:35

## 2018-10-24 RX ADMIN — FENTANYL CITRATE 100 MCG: 50 INJECTION INTRAMUSCULAR; INTRAVENOUS at 10:57

## 2018-10-24 RX ADMIN — LIDOCAINE HYDROCHLORIDE: 10 INJECTION, SOLUTION EPIDURAL; INFILTRATION; INTRACAUDAL; PERINEURAL at 11:45

## 2018-10-24 RX ADMIN — DOCUSATE SODIUM 100 MG: 100 CAPSULE, LIQUID FILLED ORAL at 17:55

## 2018-10-24 RX ADMIN — IBUPROFEN 600 MG: 600 TABLET, FILM COATED ORAL at 12:27

## 2018-10-24 RX ADMIN — Medication 20 UNITS: at 14:05

## 2018-10-24 RX ADMIN — IBUPROFEN 600 MG: 600 TABLET, FILM COATED ORAL at 18:27

## 2018-10-24 RX ADMIN — Medication 1 TABLET: at 15:10

## 2018-10-24 ASSESSMENT — PAIN SCALES - GENERAL
PAINLEVEL_OUTOF10: 1
PAINLEVEL_OUTOF10: 4

## 2018-10-24 ASSESSMENT — LIFESTYLE VARIABLES: ALCOHOL_USE: NO

## 2018-10-24 ASSESSMENT — COPD QUESTIONNAIRES
IN THE PAST 12 MONTHS DO YOU DO LESS THAN YOU USED TO BECAUSE OF YOUR BREATHING PROBLEMS: DISAGREE/UNSURE
COPD SCREENING SCORE: 0
HAVE YOU SMOKED AT LEAST 100 CIGARETTES IN YOUR ENTIRE LIFE: NO/DON'T KNOW
DURING THE PAST 4 WEEKS HOW MUCH DID YOU FEEL SHORT OF BREATH: NONE/LITTLE OF THE TIME
DO YOU EVER COUGH UP ANY MUCUS OR PHLEGM?: NO/ONLY WITH OCCASIONAL COLDS OR INFECTIONS

## 2018-10-24 ASSESSMENT — PATIENT HEALTH QUESTIONNAIRE - PHQ9
2. FEELING DOWN, DEPRESSED, IRRITABLE, OR HOPELESS: NOT AT ALL
SUM OF ALL RESPONSES TO PHQ9 QUESTIONS 1 AND 2: 0
1. LITTLE INTEREST OR PLEASURE IN DOING THINGS: NOT AT ALL

## 2018-10-24 NOTE — PROGRESS NOTES
0619- EDC 973914, G-2 P-1, GA- 39  0/7. Pt presented to labor unit with UC's. States she was 3 cm in the [facility] last Wednesday. Denies LOF and VB. Abd soft and non tender to touch. Confirms fetal movement. EFM and TOCO applied. POC discussed with pt and FOB. Both verbalized understanding.     0625- SVE 3/70/-2, Dr. Jalloh notified of pt status. Orders received to ambulate pt x 1 hour.     0652- Pt up to ambulate x 1 hour. Category One fetal heart tracing noted.     0700- SBAR endorsed to April RN.

## 2018-10-24 NOTE — PROGRESS NOTES
"1000- Report received from April T RN. PIV inserted at this time and labs drawn. Pt in active labor pain and is visibly grimacing and panting. Pt transported to S213 via wheelchair with FOB and RN. TOCO and EFM applied. Pt updated on POC.     1033- Pt reporting increasing feeling in pressure and \"feels like she will deliver soon\". SVE 6/100/ 0. Pt repeatedly asking for Epidural. IV LR bolus initiated    1055- Pt in immense amount of pain and panting, grimacing, and moaning. Pt requesting epidural. Pt given IV fentanyl for pain relief and educated on use of medication.     1106- Pt screaming in room and repeatedly saying she has a lot of pressure and needs to push. SVE at this time pt is complete/ 100/ +2. Dr. Zhong notified of pt's status. Charge RN notified and Dr. Mcdaniels to be on standby.     1122- Pt pushing uncontrollably at this point. Dr. Mcdaniels at bedside to attend delivery. RN and transition RN at bedside.     1124-  of viable infant male. APGARS 8/9. Dr. Zhong arrives shortly after delivery of infant and takes over care.    1350- Pt up to restroom and ambulates to toilet. Pericare done at this time. Steady gait with minimal dizziness reported.      1420- Pt transported to PPU via wheelchair with infant in arms. Report given to Nataly KENNEDY. Pt denies having any questions/ concerns at this time.       "

## 2018-10-24 NOTE — CARE PLAN
Problem: Safety  Goal: Will remain free from injury  Outcome: PROGRESSING AS EXPECTED  Pt family encouraged to report any spills, loose cords. TOCO and EFM are on pt to monitor baby. Pt understands to not get out of bed without assistance.  Educated on use of call light. Call light available at bedside.     Problem: Knowledge Deficit  Goal: Knowledge of disease process/condition, treatment plan, diagnostic tests, and medications will improve  Outcome: PROGRESSING AS EXPECTED  Pt educated on pain management options. Pt's questions answered about medications. Pt's questions answered regarding cervical checks and birthing process. Pt verbalized understanding.

## 2018-10-24 NOTE — PROGRESS NOTES
"Report received from NOC RN, POC discussed, assumed pt care.  28 y.o.  EDC 18 EGA 38.5. NKDA, GBS negative. In room LDA3 with FOB \"Elieser\" Hx of Thalassemia beta Minor (iron deficiency) takes iron daily.   0850 Repeat SVE 5/100/-2/mid/vertex.   0945 FOB came out to RN station C/o SROM, RN in room, pt grossly ruptured. Dr. Jalloh post call, report to Dr. Zhong, admit order received.   1000 Repeat SVE 5-6/100/-2/vertex. Report to Jessika Barclay, pt to be transferred to room 213 when clean.   "

## 2018-10-24 NOTE — PROGRESS NOTES
Procedure Note:    I was called to patient's room for delivery.  Patient's physician was not available in-house.  Per report, patient has a term gestation had spontaneous rupture of fetal membranes with clear fluid and she is a multiparous female.  Patient was pushing uncontrollably as I entered the room.  Sterile set up was performed and patient pushed with 2 contractions and had a normal spontaneous vaginal delivery of a liveborn male in the cephalic OA presentation over small second-degree perineal laceration to sterile field.  The shoulders were delivered without difficulty.  There was a loose nuchal cord which was reduced and delivery of the head. On delivery, the baby's mouth and nose were suctioned bulb suctioned and baby was placed on mom's chest active and crying.  After 1 minute the cord was clamped and cut.  Placenta was delivered spontaneously intact and normal configuration with three-vessel cord.  Uterus was firm and exam.  There is a second-degree perineal laceration and exploration.  At this point Dr. Zhong arrived and took over care of patient.

## 2018-10-24 NOTE — L&D DELIVERY NOTE
DATE OF SERVICE:  10/24/2018    TYPE OF DELIVERY:  Spontaneous vaginal delivery.    HISTORY:  Patient is a 28-year-old  2, para 1 female, was admitted to   labor and delivery around 9:00 a.m.  She was transferred to labor and delivery   just after 10:00 a.m. and was said to be 5 cm dilated; however, underwent   spontaneous rupture of membranes and at that time, she was requesting an   epidural anesthetic, was found to be completely dilated.  I was called for   delivery, but patient precipitously delivered by Dr. Matthew Mcdaniels, a   healthy male infant with Apgars of 8 and 9.  When I arrived, the patient had   already delivered the placenta.  I attended to the patient after this time.    Examination revealed a second-degree perineal injury, which was anesthetized   with 1% Xylocaine, 10 mL in the subQ and then 3-0 chromic suture was used to   reapproximate the tissue.  Estimated blood loss for the delivery was 300 mL.    There were no complications.       ____________________________________     MD ALEX GONZALES / PABLO    DD:  10/24/2018 11:51:36  DT:  10/24/2018 12:02:24    D#:  [id number]  Job#:  [id number]

## 2018-10-24 NOTE — PROGRESS NOTES
Report received from Jessika L&D RN at the bedside. Pt arrived to the floor via wheelchair at 1430. Cuddles and armband checked. Assumed care.  A/O x4. VSS. Responds appropriately. Denies pain, SOB. Assessment complete. Fundus firm, lochia light.  Discussed POC, pain control,  care, skin to skin, breastfeeding times, safety, DC planning, pt verbalizes understanding.  Call light and belongings within reach. Bed in the lowest position. Treaded socks in place. Hourly rounding in progress. Will continue to monitor .

## 2018-10-25 VITALS
BODY MASS INDEX: 28.12 KG/M2 | RESPIRATION RATE: 18 BRPM | HEART RATE: 75 BPM | HEIGHT: 66 IN | OXYGEN SATURATION: 97 % | DIASTOLIC BLOOD PRESSURE: 53 MMHG | WEIGHT: 175 LBS | SYSTOLIC BLOOD PRESSURE: 100 MMHG | TEMPERATURE: 98.3 F

## 2018-10-25 PROBLEM — O11.9 CHRONIC HYPERTENSION WITH SUPERIMPOSED PREECLAMPSIA: Status: ACTIVE | Noted: 2018-10-25

## 2018-10-25 PROBLEM — R52 PAIN RELATED TO VAGINAL DELIVERY: Status: ACTIVE | Noted: 2018-10-25

## 2018-10-25 PROBLEM — Z98.891 S/P CESAREAN SECTION: Status: ACTIVE | Noted: 2018-10-25

## 2018-10-25 PROBLEM — G89.18 ACUTE POST-OPERATIVE PAIN: Status: ACTIVE | Noted: 2018-10-25

## 2018-10-25 PROCEDURE — A9270 NON-COVERED ITEM OR SERVICE: HCPCS | Performed by: OBSTETRICS & GYNECOLOGY

## 2018-10-25 PROCEDURE — 700102 HCHG RX REV CODE 250 W/ 637 OVERRIDE(OP): Performed by: OBSTETRICS & GYNECOLOGY

## 2018-10-25 PROCEDURE — 700112 HCHG RX REV CODE 229: Performed by: OBSTETRICS & GYNECOLOGY

## 2018-10-25 RX ORDER — IBUPROFEN 600 MG/1
600 TABLET ORAL EVERY 6 HOURS PRN
Qty: 30 TAB | Refills: 0 | Status: SHIPPED | OUTPATIENT
Start: 2018-10-25 | End: 2018-10-25

## 2018-10-25 RX ORDER — IBUPROFEN 600 MG/1
600 TABLET ORAL EVERY 6 HOURS PRN
Qty: 30 TAB | Refills: 0 | Status: SHIPPED | OUTPATIENT
Start: 2018-10-25 | End: 2019-02-02 | Stop reason: CLARIF

## 2018-10-25 RX ADMIN — IBUPROFEN 600 MG: 600 TABLET, FILM COATED ORAL at 09:44

## 2018-10-25 RX ADMIN — IBUPROFEN 600 MG: 600 TABLET, FILM COATED ORAL at 03:46

## 2018-10-25 RX ADMIN — DOCUSATE SODIUM 100 MG: 100 CAPSULE, LIQUID FILLED ORAL at 09:44

## 2018-10-25 RX ADMIN — Medication 1 TABLET: at 08:26

## 2018-10-25 ASSESSMENT — EDINBURGH POSTNATAL DEPRESSION SCALE (EPDS)
I HAVE FELT SCARED OR PANICKY FOR NO GOOD REASON: NO, NOT AT ALL
I HAVE BEEN SO UNHAPPY THAT I HAVE HAD DIFFICULTY SLEEPING: NOT AT ALL
I HAVE FELT SAD OR MISERABLE: NO, NOT AT ALL
THE THOUGHT OF HARMING MYSELF HAS OCCURRED TO ME: NEVER
I HAVE BEEN ANXIOUS OR WORRIED FOR NO GOOD REASON: NO, NOT AT ALL
I HAVE BEEN ABLE TO LAUGH AND SEE THE FUNNY SIDE OF THINGS: AS MUCH AS I ALWAYS COULD
I HAVE BEEN SO UNHAPPY THAT I HAVE BEEN CRYING: NO, NEVER
I HAVE LOOKED FORWARD WITH ENJOYMENT TO THINGS: AS MUCH AS I EVER DID
I HAVE BLAMED MYSELF UNNECESSARILY WHEN THINGS WENT WRONG: NO, NEVER
THINGS HAVE BEEN GETTING ON TOP OF ME: NO, I HAVE BEEN COPING AS WELL AS EVER

## 2018-10-25 ASSESSMENT — PAIN SCALES - GENERAL
PAINLEVEL_OUTOF10: 3
PAINLEVEL_OUTOF10: 1
PAINLEVEL_OUTOF10: 5

## 2018-10-25 NOTE — PROGRESS NOTES
ID bands verified by this RN. Discharge instructions reviewed with patient and signed by patient. Follow up appointments and prescriptions reviewed with patient and prescriptions given to patient. All questions addressed at this time. Instructed to press call light when infant strapped in car seat for car seat check. Primary RN will remove Cuddles transponder when parents ready to leave.

## 2018-10-25 NOTE — LACTATION NOTE
This note was copied from a baby's chart.  Couplet will be going home today. Mother requested nipple shield (NS) 24 mm yesterday on arrival, used nipple shield x 5-6 months with first baby. Assisted baby to left breast using laying back position without NS, baby too sleepy, no latch. Educated mother on importance of getting deep latch when using NS, and need for frequent weight checks.  Mother has Medicaid, information to have mother sign-up with WIC given. Also, encouraged mother to attend [facility] [place], for weight checks if baby consistently uses NS at home. Discussed with mother to F/U with [facility] for outpatient lactation support.     Teaching on hunger cues, breastfeeding when baby shows cues or by 3 hours from last feed, importance of skin to skin, cluster feeding, hand expression & positioning baby at breast.       Breastfeed on demand or by 3 hours from last feed, lots of skin to skin.

## 2018-10-25 NOTE — PROGRESS NOTES
1900 Received bedside report from BRENT Ingram. Discussed plan of care. Patient will call for pain medication as needed. All needs met at this time.

## 2018-10-25 NOTE — CARE PLAN
Problem: Safety  Goal: Will remain free from injury  Outcome: PROGRESSING AS EXPECTED  Treaded socks in place, bed in the lowest position,  call light and belongings within reach, pt call for assistance appropriately    Problem: Pain Management  Goal: Pain level will decrease to patient's comfort goal  Outcome: PROGRESSING AS EXPECTED  Medicated for pain per MAR with adequate pain control, hourly rounding inprogress    Problem: Altered physiologic condition related to immediate post-delivery state and potential for bleeding/hemorrhage  Goal: Patient physiologically stable as evidenced by normal lochia, palpable uterine involution and vital signs within normal limits  Outcome: PROGRESSING AS EXPECTED  VSS, fundus firm, lochia light

## 2018-10-25 NOTE — DISCHARGE SUMMARY
"Discharge Summary    Admission Date: 10/24/18    Discharge Date: 10/25/18    Diagnosis:Active Problems:    Pain related to vaginal delivery    Labor and delivery indication for care or intervention     (spontaneous vaginal delivery)    Subjective: Pain controlled. Normal lochia. Breast feeding. Eating and voiding without difficulty    /45   Pulse 67   Temp 36.3 °C (97.4 °F)   Resp 16   Ht 1.676 m (5' 6\")   Wt 79.4 kg (175 lb)   LMP 2018   SpO2 96%   BMI 28.25 kg/m²     GEN: NAD  GI:soft, NT, ND  :fundus firm and below umbilicus  EXT:no edema    Hospital Course:Francois Quintero is a 27 yo  who presented to [facility] at 39w0d in labor. She is s/p  viable male infant APGARS 8/9. EBL 300cc. Second degree laceration repaired. She was meeting all post partum goals and requesting discharge home on PPD#1.    Discharge Instructions   1. Diet : general  2. Activity :Pelvic rest for 6 weeks       Francois Quintero   Home Medication Instructions SUSY:[id number]    Printed on:10/25/18 0720   Medication Information                      ibuprofen (MOTRIN) 600 MG Tab  Take 1 Tab by mouth every 6 hours as needed (For cramping after delivery; do not give if patient is receiving ketorolac (Toradol)).             Prenatal Vit-Fe Fumarate-FA (PRENATAL PO)  Take 1 Tab by mouth every day.                 Follow up: 6 weeks    Complications:none    Alison Jalloh M.D.    "

## 2018-10-25 NOTE — DISCHARGE INSTRUCTIONS
POSTPARTUM DISCHARGE INSTRUCTIONS FOR MOM    YOB: 1990   Age: 28 y.o.               Admit Date: 10/24/2018     Discharge Date: 10/25/2018  Attending Doctor:  Alison Jalloh M.D.                  Allergies:  Patient has no known allergies.    Discharged to home by car. Discharged via wheelchair, hospital escort: Yes.  Special equipment needed: Not Applicable  Belongings with: Personal  Be sure to schedule a follow-up appointment with your primary care doctor or any specialists as instructed.     Discharge Plan:     Pelvic rest for 6 weeks  Follow up in 6 weeks    Influenza Vaccine Indication: Patient Refuses    REASONS TO CALL YOUR OBSTETRICIAN:  1.   Persistent fever or shaking chills (Temperature higher than 100.4)  2.   Heavy bleeding (soaking more than 1 pad per hour); Passing clots  3.   Foul odor from vagina  4.   Mastitis (Breast infection; breast pain, chills, fever, redness)  5.   Urinary pain, burning or frequency  6.   Episiotomy infection  7.   Abdominal incision infection  8.   Severe depression longer than 24 hours    HAND WASHING  · Prior to handling the baby.  · Before breastfeeding or bottle feeding baby.  · After using the bathroom or changing the baby's diaper.      VAGINAL CARE  · Nothing inside vagina for 6 weeks: no sexual intercourse, tampons or douching.  · Bleeding may continue for 2-4 weeks.  Amount may vary.    · Call your physician for heavy bleeding which means soaking more than 1 pad per hour    BIRTH CONTROL  · It is possible to become pregnant at any time after delivery and while breastfeeding.  · Plan to discuss a method of birth control with your physician at your follow up visit. visit.    DIET AND ELIMINATION  · Eating more fiber (bran cereal, fruits, and vegetables) and drinking plenty of fluids will help to avoid constipation.  · Urinary frequency after childbirth is normal.    POSTPARTUM BLUES  During the first few days after birth, you may experience a sense of  "the \"blues\" which may include impatience, irritability or even crying.  These feeling come and go quickly.  However, as many as 1 in 10 women experience emotional symptoms known as postpartum depression.    Postpartum depression:  May start as early as the second or third day after delivery or take several weeks or months to develop.  Symptoms of \"blues\" are present, but are more intense:  Crying spells; loss of appetite; feelings of hopelessness or loss of control; fear of touching the baby; over concern or no concern at all about the baby; little or no concern about your own appearance/caring for yourself; and/or inability to sleep or excessive sleeping.  Contact your physician if you are experiencing any of these symptoms.    Crisis Hotline:  · [place] Crisis Hotline:  6-339-SJDWPIE  Or 1-525.685.8054  · [place] Crisis Hotline:  1-582.618.3904  Or 119-381-7643    PREVENTING SHAKEN BABY:  If you are angry or stressed, PUT THE BABY IN THE CRIB, step away, take some deep breaths, and wait until you are calm to care for the baby.  DO NOT SHAKE THE BABY.  You are not alone, call a supporter for help.    · Crisis Call Center 24/7 crisis line 381-931-9674 or 1-239.369.1661  · You can also text them, text \"ANSWER\" to 489072    QUIT SMOKING/TOBACCO USE:  I understand the use of any tobacco products increases my chance of suffering from future heart disease and could cause other illnesses which may shorten my life. Quitting the use of tobacco products is the single most important thing I can do to improve my health. For further information on smoking / tobacco cessation call a Toll Free Quit Line at 1-557.914.7678 (*National Cancer [place]) or 1-872.816.5432 (American Lung Association) or you can access the web based program at www.lungusa.org.    · [place] Tobacco Users Help Line:  (108) 211-9984       Toll Free: 1-575.102.3337  · Quit Tobacco Program [facility] (377)224-7183    DEPRESSION / " SUICIDE RISK:  As you are discharged from this [facility], it is important to learn how to keep safe from harming yourself.    Recognize the warning signs:  · Abrupt changes in personality, positive or negative- including increase in energy   · Giving away possessions  · Change in eating patterns- significant weight changes-  positive or negative  · Change in sleeping patterns- unable to sleep or sleeping all the time   · Unwillingness or inability to communicate  · Depression  · Unusual sadness, discouragement and loneliness  · Talk of wanting to die  · Neglect of personal appearance   · Rebelliousness- reckless behavior  · Withdrawal from people/activities they love  · Confusion- inability to concentrate     If you or a loved one observes any of these behaviors or has concerns about self-harm, here's what you can do:  · Talk about it- your feelings and reasons for harming yourself  · Remove any means that you might use to hurt yourself (examples: pills, rope, extension cords, firearm)  · Get professional help from the community (Mental Health, Substance Abuse, psychological counseling)  · Do not be alone:Call your Safe Contact- someone whom you trust who will be there for you.  · Call your local CRISIS HOTLINE 305-9648 or 291-044-3748  · Call your local Children's Mobile Crisis Response Team [place] (958) 849-8270 or www.Intacct  · Call the toll free National Suicide Prevention Hotlines   · National Suicide Prevention Lifeline 907-289-FGNH (8393)  · National Hope Line Network 800-SUICIDE (771-2846)    DISCHARGE SURVEY:  Thank you for choosing [facility].  We hope we provided you with very good care.  You may be receiving a survey in the mail.  Please fill it out.  Your opinion is valuable to us.    ADDITIONAL EDUCATIONAL MATERIALS GIVEN TO PATIENT:        My signature on this form indicates that:  1.  I have reviewed and understand the above information  2.  My questions regarding  this information have been answered to my satisfaction.  3.  I have formulated a plan with my discharge nurse to obtain my prescribed medication for home.

## 2018-10-25 NOTE — PROGRESS NOTES
Report received. Assumed care. Pt in bed awake. A/O x4. VSS. Responds appropriately. Denies pain, SOB. Assessment complete. Fundus firm, lochia light. Discussed POC, pain control,  care, breastfeeding times, lactation consult, safety, DC planning , pt verbalizes understanding. Call light and belongings within reach. Bed in the lowest position. Treaded socks in place. Hourly rounding in progress. Will continue to monitor .

## 2018-10-25 NOTE — PROGRESS NOTES
DC'd home. Cord clamp and cuddles removed. All questions answered. Pt escorted out via wheelchair with volunteer at 1455 without any incident

## 2018-10-25 NOTE — CARE PLAN
Problem: Safety  Goal: Will remain free from injury  Outcome: PROGRESSING AS EXPECTED  Treaded socks in place, bed in the lowest position, call light and belongings within reach, pt call for assistance appropriately    Problem: Pain Management  Goal: Pain level will decrease to patient's comfort goal  Outcome: PROGRESSING AS EXPECTED  Medicated per MAR for pain with adequate pain control, hourly rounding in progress    Problem: Altered physiologic condition related to immediate post-delivery state and potential for bleeding/hemorrhage  Goal: Patient physiologically stable as evidenced by normal lochia, palpable uterine involution and vital signs within normal limits  Outcome: PROGRESSING AS EXPECTED  VS, fundus firm, lochia light

## 2018-10-25 NOTE — LACTATION NOTE
This note was copied from a baby's chart.  Mother concerned baby has not latched since circumcision. LC assisted with hand expression & spoon feeding baby, attempt to latch baby, continues to be sleepy no latch. Mother has lots of colostrum and can easily hand express 3-5 ml's. Discussed with mother to hand express & spoon feed back every 30-60 minutes until baby wakes up and latches, baby needs to be fed. Supplemental guideline given with review, encouraged mother to hand express volumes according to guideline until baby wakes and latches (may use NS). Mother requested hand pump because she has a difficult time hand expressing herself. Encouraged mother to watch [employer] video's. Hand pump given.

## 2019-01-26 ENCOUNTER — APPOINTMENT (OUTPATIENT)
Dept: RADIOLOGY | Facility: MEDICAL CENTER | Age: 29
End: 2019-01-26
Attending: EMERGENCY MEDICINE
Payer: MEDICAID

## 2019-01-26 ENCOUNTER — HOSPITAL ENCOUNTER (EMERGENCY)
Facility: MEDICAL CENTER | Age: 29
End: 2019-01-27
Attending: EMERGENCY MEDICINE
Payer: MEDICAID

## 2019-01-26 DIAGNOSIS — N83.201 CYST OF RIGHT OVARY: ICD-10-CM

## 2019-01-26 DIAGNOSIS — R10.31 RIGHT LOWER QUADRANT ABDOMINAL PAIN: ICD-10-CM

## 2019-01-26 LAB
ALBUMIN SERPL BCP-MCNC: 4.3 G/DL (ref 3.2–4.9)
ALBUMIN/GLOB SERPL: 1.6 G/DL
ALP SERPL-CCNC: 39 U/L (ref 30–99)
ALT SERPL-CCNC: 63 U/L (ref 2–50)
ANION GAP SERPL CALC-SCNC: 7 MMOL/L (ref 0–11.9)
AST SERPL-CCNC: 32 U/L (ref 12–45)
BASOPHILS # BLD AUTO: 0.9 % (ref 0–1.8)
BASOPHILS # BLD: 0.07 K/UL (ref 0–0.12)
BILIRUB SERPL-MCNC: 0.5 MG/DL (ref 0.1–1.5)
BUN SERPL-MCNC: 15 MG/DL (ref 8–22)
CALCIUM SERPL-MCNC: 8.9 MG/DL (ref 8.5–10.5)
CHLORIDE SERPL-SCNC: 103 MMOL/L (ref 96–112)
CO2 SERPL-SCNC: 28 MMOL/L (ref 20–33)
CREAT SERPL-MCNC: 0.97 MG/DL (ref 0.5–1.4)
EOSINOPHIL # BLD AUTO: 0.61 K/UL (ref 0–0.51)
EOSINOPHIL NFR BLD: 7.8 % (ref 0–6.9)
ERYTHROCYTE [DISTWIDTH] IN BLOOD BY AUTOMATED COUNT: 32 FL (ref 35.9–50)
GLOBULIN SER CALC-MCNC: 2.7 G/DL (ref 1.9–3.5)
GLUCOSE SERPL-MCNC: 117 MG/DL (ref 65–99)
HCG SERPL QL: NEGATIVE
HCT VFR BLD AUTO: 38.7 % (ref 37–47)
HGB BLD-MCNC: 12.1 G/DL (ref 12–16)
IMM GRANULOCYTES # BLD AUTO: 0.01 K/UL (ref 0–0.11)
IMM GRANULOCYTES NFR BLD AUTO: 0.1 % (ref 0–0.9)
LIPASE SERPL-CCNC: 17 U/L (ref 11–82)
LYMPHOCYTES # BLD AUTO: 3.45 K/UL (ref 1–4.8)
LYMPHOCYTES NFR BLD: 43.9 % (ref 22–41)
MCH RBC QN AUTO: 20.8 PG (ref 27–33)
MCHC RBC AUTO-ENTMCNC: 31.3 G/DL (ref 33.6–35)
MCV RBC AUTO: 66.5 FL (ref 81.4–97.8)
MONOCYTES # BLD AUTO: 0.8 K/UL (ref 0–0.85)
MONOCYTES NFR BLD AUTO: 10.2 % (ref 0–13.4)
NEUTROPHILS # BLD AUTO: 2.92 K/UL (ref 2–7.15)
NEUTROPHILS NFR BLD: 37.1 % (ref 44–72)
NRBC # BLD AUTO: 0 K/UL
NRBC BLD-RTO: 0 /100 WBC
PLATELET # BLD AUTO: 276 K/UL (ref 164–446)
PMV BLD AUTO: 10.2 FL (ref 9–12.9)
POTASSIUM SERPL-SCNC: 3.5 MMOL/L (ref 3.6–5.5)
PROT SERPL-MCNC: 7 G/DL (ref 6–8.2)
RBC # BLD AUTO: 5.82 M/UL (ref 4.2–5.4)
SODIUM SERPL-SCNC: 138 MMOL/L (ref 135–145)
WBC # BLD AUTO: 7.9 K/UL (ref 4.8–10.8)

## 2019-01-26 PROCEDURE — 84703 CHORIONIC GONADOTROPIN ASSAY: CPT

## 2019-01-26 PROCEDURE — 74177 CT ABD & PELVIS W/CONTRAST: CPT

## 2019-01-26 PROCEDURE — 83690 ASSAY OF LIPASE: CPT

## 2019-01-26 PROCEDURE — 99284 EMERGENCY DEPT VISIT MOD MDM: CPT

## 2019-01-26 PROCEDURE — 85025 COMPLETE CBC W/AUTO DIFF WBC: CPT

## 2019-01-26 PROCEDURE — 80053 COMPREHEN METABOLIC PANEL: CPT

## 2019-01-26 PROCEDURE — 700117 HCHG RX CONTRAST REV CODE 255: Performed by: EMERGENCY MEDICINE

## 2019-01-26 PROCEDURE — 70360 X-RAY EXAM OF NECK: CPT

## 2019-01-26 RX ADMIN — IOHEXOL 100 ML: 350 INJECTION, SOLUTION INTRAVENOUS at 23:25

## 2019-01-27 VITALS
RESPIRATION RATE: 16 BRPM | HEART RATE: 72 BPM | OXYGEN SATURATION: 98 % | HEIGHT: 66 IN | TEMPERATURE: 97.6 F | DIASTOLIC BLOOD PRESSURE: 70 MMHG | SYSTOLIC BLOOD PRESSURE: 118 MMHG | WEIGHT: 158.95 LBS | BODY MASS INDEX: 25.55 KG/M2

## 2019-01-27 NOTE — ED TRIAGE NOTES
Pt has had  abd pain for months unknown why,  Has also had lump in throat for months now is feeling lump in right upper breast, is worried about CA..  NAD noted. stabbing pain in abd.

## 2019-01-27 NOTE — ED NOTES
Pt discharge to home.  Pt provided with discharge instructions.  Pt verbalized understanding, all questions answered.  VSS upon DC. Pt steady on feet upon DC.

## 2019-01-27 NOTE — ED PROVIDER NOTES
"ED Provider Note    ED Provider Note      Primary care provider: MEAGHAN Edmonds    CHIEF COMPLAINT  Chief Complaint   Patient presents with   • Sore Throat   • Abdominal Pain   • Breast Mass       HPI  Francois Quintero is a 28 y.o. female who presents to the Emergency Department with chief complaint of multiple complaints.  Patient reports that she feels as though she is had some stuck in her throat unable to clear it for several days.  She is also had ongoing right lower quadrant.  Lastly patient reports that her fiancé found a lump in her breast couple days ago and she feels as though it is gotten bigger and harder over the last couple days.  Patient just recently stopped breast-feeding constipation diarrhea or change in bowel movements no urinary she is had no fevers no chills no cough no chest pain shortness of breath no other acute concerns at this time.  Patient previously had similar abdominal pain while she was pregnant has had several ultrasounds of the abdomen at that time that were suspicious for lipoma hematoma or possible periuterine thrombosis.    REVIEW OF SYSTEMS  10 systems reviewed and otherwise negative, pertinent positives and negatives listed in the history of present illness.    PAST MEDICAL HISTORY     Patient denies  SURGICAL HISTORY  patient denies any surgical history    SOCIAL HISTORY  Social History   Substance Use Topics   • Smoking status: Never Smoker   • Smokeless tobacco: Never Used   • Alcohol use No      History   Drug Use No       FAMILY HISTORY  Non-Contributory    CURRENT MEDICATIONS  Home Medications    **Home medications have not yet been reviewed for this encounter**         ALLERGIES  No Known Allergies    PHYSICAL EXAM  VITAL SIGNS: /84   Pulse 84   Temp 36.4 °C (97.6 °F) (Temporal)   Resp 16   Ht 1.676 m (5' 6\")   Wt 72.1 kg (158 lb 15.2 oz)   LMP 01/26/2018   SpO2 98%   BMI 25.66 kg/m²   Pulse ox interpretation: I interpret this pulse ox as " normal.  Constitutional: Alert and oriented x 3, minimal distress  HEENT: Atraumatic normocephalic, pupils are equal round reactive to light extraocular movements are intact. The nares is clear, external ears are normal, mouth shows moist mucous membranes  Neck: Supple, no JVD no tracheal deviation  Cardiovascular: Regular rate and rhythm no murmur rub or gallop 2+ pulses peripherally x4  Thorax & Lungs: No respiratory distress, no wheezes rales or rhonchi, firm palpable mass over the medial upper quadrant of the breast very firm, mobile slightly tender no nipple discharge no axillary lymphadenopathy   GI: Tender to palpation to light and deep palpation in the right lower quadrant no rebound no guarding positive.  : Deferred  Rectal: Deferred  Skin: Warm dry no acute rash or lesion  Musculoskeletal: Moving all extremities with full range and 5 of 5 strength, no acute  deformity  Neurologic: Cranial nerves III through XII are grossly intact, no sensory deficit, no cerebellar dysfunction   Psychiatric: Appropriate affect for situation at this time      DIAGNOSTIC STUDIES / PROCEDURES  LABS    Results for orders placed or performed during the hospital encounter of 01/26/19   CBC WITH DIFFERENTIAL   Result Value Ref Range    WBC 7.9 4.8 - 10.8 K/uL    RBC 5.82 (H) 4.20 - 5.40 M/uL    Hemoglobin 12.1 12.0 - 16.0 g/dL    Hematocrit 38.7 37.0 - 47.0 %    MCV 66.5 (L) 81.4 - 97.8 fL    MCH 20.8 (L) 27.0 - 33.0 pg    MCHC 31.3 (L) 33.6 - 35.0 g/dL    RDW 32.0 (L) 35.9 - 50.0 fL    Platelet Count 276 164 - 446 K/uL    MPV 10.2 9.0 - 12.9 fL    Neutrophils-Polys 37.10 (L) 44.00 - 72.00 %    Lymphocytes 43.90 (H) 22.00 - 41.00 %    Monocytes 10.20 0.00 - 13.40 %    Eosinophils 7.80 (H) 0.00 - 6.90 %    Basophils 0.90 0.00 - 1.80 %    Immature Granulocytes 0.10 0.00 - 0.90 %    Nucleated RBC 0.00 /100 WBC    Neutrophils (Absolute) 2.92 2.00 - 7.15 K/uL    Lymphs (Absolute) 3.45 1.00 - 4.80 K/uL    Monos (Absolute) 0.80 0.00 -  0.85 K/uL    Eos (Absolute) 0.61 (H) 0.00 - 0.51 K/uL    Baso (Absolute) 0.07 0.00 - 0.12 K/uL    Immature Granulocytes (abs) 0.01 0.00 - 0.11 K/uL    NRBC (Absolute) 0.00 K/uL   COMP METABOLIC PANEL   Result Value Ref Range    Sodium 138 135 - 145 mmol/L    Potassium 3.5 (L) 3.6 - 5.5 mmol/L    Chloride 103 96 - 112 mmol/L    Co2 28 20 - 33 mmol/L    Anion Gap 7.0 0.0 - 11.9    Glucose 117 (H) 65 - 99 mg/dL    Bun 15 8 - 22 mg/dL    Creatinine 0.97 0.50 - 1.40 mg/dL    Calcium 8.9 8.5 - 10.5 mg/dL    AST(SGOT) 32 12 - 45 U/L    ALT(SGPT) 63 (H) 2 - 50 U/L    Alkaline Phosphatase 39 30 - 99 U/L    Total Bilirubin 0.5 0.1 - 1.5 mg/dL    Albumin 4.3 3.2 - 4.9 g/dL    Total Protein 7.0 6.0 - 8.2 g/dL    Globulin 2.7 1.9 - 3.5 g/dL    A-G Ratio 1.6 g/dL   LIPASE   Result Value Ref Range    Lipase 17 11 - 82 U/L   BETA-HCG QUALITATIVE SERUM   Result Value Ref Range    Beta-Hcg Qualitative Serum Negative Negative   ESTIMATED GFR   Result Value Ref Range    GFR If African American >60 >60 mL/min/1.73 m 2    GFR If Non African American >60 >60 mL/min/1.73 m 2           All labs reviewed by me.      RADIOLOGY  CT-ABDOMEN-PELVIS WITH   Final Result      No acute abnormality of the abdomen or pelvis. The appendix is not visualized, early acute appendicitis cannot be excluded.      DX-NECK FOR SOFT TISSUE   Final Result      No acute abnormality of the neck soft tissues.        The radiologist's interpretation of all radiological studies have been reviewed by me.    COURSE & MEDICAL DECISION MAKING  Pertinent Labs & Imaging studies reviewed. (See chart for details)    10:18 PM - Patient seen and examined at bedside.             Patient noted to have slightly elevated blood pressure likely circumstantial secondary to presenting complaint. Referred to primary care physician for further evaluation.    Medical Decision Making: CT scan demonstrates right-sided ovarian cyst otherwise unremarkable.  X-rays of the neck are unremarkable  "patient likely with fibrocystic change of the right breast however she is referred to both primary care and her OB/GYN for further evaluation she is well aware that she needs formal breast ultrasound to further characterize the mass.  Patient will also follow-up with OB/GYN in regards to her ovarian cyst.  Return for worsening pain nausea vomiting any other new symptoms or concerns otherwise discharged in stable and improved condition.    /84   Pulse 84   Temp 36.4 °C (97.6 °F) (Temporal)   Resp 16   Ht 1.676 m (5' 6\")   Wt 72.1 kg (158 lb 15.2 oz)   LMP 01/26/2018   SpO2 98%   BMI 25.66 kg/m²     Faraz Lee, A.P.N.  [address]  [address]  [facility] [id number] [phone]    Schedule an appointment as soon as possible for a visit   for re-check of breast mass    [address] [address]  [phone]  Schedule an appointment as soon as possible for a visit   for establishment of primary care, for blood pressure management    Alison Jalloh M.D.  [address] [facility]  [address]  [facility] [id number] [phone]    Schedule an appointment as soon as possible for a visit   For reevaluation of ovarian cyst and breast mass.      Discharge Medication List as of 1/27/2019 12:48 AM          FINAL IMPRESSION  1. Cyst of right ovary Active   2. Right lower quadrant abdominal pain Active    3.  Right breast mass  4.  Globus pharyngitis      This dictation has been created using voice recognition software and/or scribes. The accuracy of the dictation is limited by the abilities of the software and the expertise of the scribes. I expect there may be some errors of grammar and possibly content. I made every attempt to manually correct the errors within my dictation. However, errors related to voice recognition software and/or scribes may still exist and should be interpreted within the appropriate context.            "

## 2019-02-02 ENCOUNTER — HOSPITAL ENCOUNTER (EMERGENCY)
Facility: MEDICAL CENTER | Age: 29
End: 2019-02-02
Attending: EMERGENCY MEDICINE
Payer: MEDICAID

## 2019-02-02 VITALS
WEIGHT: 158.73 LBS | OXYGEN SATURATION: 98 % | BODY MASS INDEX: 25.51 KG/M2 | SYSTOLIC BLOOD PRESSURE: 135 MMHG | TEMPERATURE: 97.9 F | HEART RATE: 85 BPM | DIASTOLIC BLOOD PRESSURE: 73 MMHG | HEIGHT: 66 IN | RESPIRATION RATE: 16 BRPM

## 2019-02-02 DIAGNOSIS — N63.0 BREAST MASS IN FEMALE: ICD-10-CM

## 2019-02-02 PROCEDURE — 99283 EMERGENCY DEPT VISIT LOW MDM: CPT

## 2019-02-02 NOTE — ED NOTES
"Pt was seen at [facility] approximately a week ago for evaluation of a lump in her breast observed approximately 2 days prior. She feels as though it is becoming larger and harder.  Chief Complaint   Patient presents with   • Breast Mass     /73   Pulse 86   Temp 36.4 °C (97.6 °F) (Temporal)   Resp 18   Ht 1.676 m (5' 6\")   Wt 72 kg (158 lb 11.7 oz)   LMP 01/26/2018   SpO2 98%   Breastfeeding? No   BMI 25.62 kg/m²     "

## 2019-02-02 NOTE — ED PROVIDER NOTES
"ED Provider Note    Scribed for Kalpesh Hoffman M.D. by Kalpesh Hoffman. 2/2/2019  11:12 AM    CHIEF COMPLAINT  Chief Complaint   Patient presents with   • Breast Mass       HPI  Francois Quintero is a 28 y.o. female who presents to the Emergency Room for reevaluation of a right breast lump for which she was seen about a week ago, and told that she needs a formal breast ultrasound.  She said that she was not sure where to obtain that follow-up, so presented here.  She reports that the lump seems larger and more tender, and she also states that this is in association with ceasing breast-feeding about a week ago.  She denies fevers or chills or trauma or nipple discharge, other than breast milk.  She does have a history of fibrocystic breast disease.  This lump was evaluated by x-ray, in addition to other concerns, and her recent emergency department visit, and there was no evidence of bony lesion.    REVIEW OF SYSTEMS  See HPI for further details.    PAST MEDICAL HISTORY   Fibrocystic breast disease.    SOCIAL HISTORY  Social History     Social History Main Topics   • Smoking status: Never Smoker   • Smokeless tobacco: Never Used   • Alcohol use No   • Drug use: No   • Sexual activity: Not on file       SURGICAL HISTORY  patient denies any surgical history    CURRENT MEDICATIONS  Home Medications     Reviewed by Diego Cartwright (Pharmacy Tech) on 02/02/19 at 1109  Med List Status: Complete   Medication Last Dose Status        Patient Chon Taking any Medications                       ALLERGIES  No Known Allergies    PHYSICAL EXAM  VITAL SIGNS: /73   Pulse 86   Temp 36.4 °C (97.6 °F) (Temporal)   Resp 18   Ht 1.676 m (5' 6\")   Wt 72 kg (158 lb 11.7 oz)   LMP 01/26/2018   SpO2 98%   Breastfeeding? No   BMI 25.62 kg/m²   Pulse ox interpretation: I interpret this pulse ox as normal.  Constitutional: Alert in no apparent distress.  HENT: Normocephalic, Atraumatic, Bilateral external ears " normal. Nose normal.   Eyes: Conjunctiva normal, non-icteric.   Heart: Regular rate and rythm, no murmurs.    Lungs: Clear to auscultation bilaterally.  Chest: There is a perhaps slightly tender right sided approximately 2 cm x 2 cm firm fibrous feeling breast lump at about the second rib in the midclavicular line.  There is no fluctuance or erythema or evidence of infection.  Skin: Warm, Dry, No erythema, No rash.   Neurologic: Alert, Grossly non-focal.   Psychiatric: Affect normal, Judgment normal, Mood normal, Appears appropriate and not intoxicated.     COURSE & MEDICAL DECISION MAKING  The patient's VS, Nurses notes reviewed. (See chart for details)    11:12 AM Patient seen and examined at bedside.  We discussed again that she needs a formal breast ultrasound, and these are not done through emergency departments.  There is no evidence of infection, this is not an abscess, thus there is no further workup necessary in the emergency department.  I will refer the patient to [facility], and to the [facility], which we understand is no seeing GYN cases.  She understands that she needs to call these to offices to obtain a follow-up care.     The patient will return for new or worsening symptoms and is stable at the time of discharge.    The patient is referred to a primary physician for blood pressure management, diabetic screening, and for all other preventative health concerns.      DISPOSITION:  Patient will be discharged home in stable condition.    FOLLOW UP:  [facility]  [address]  [facility] [id number] [phone]  Schedule an appointment as soon as possible for a visit   for breast lump consultation    [address]  [facility] [address]  [phone]    Call   to ask if they evaluate breast masses.      OUTPATIENT MEDICATIONS:  New Prescriptions    No medications on file         FINAL IMPRESSION  1.  Breast mass in female

## 2019-02-02 NOTE — DISCHARGE INSTRUCTIONS
You will need a formal breast ultrasound, which is not performed through the emergency department.  The 2 referrals that we have provided to find the earliest appointment.  [facility] does these exams, and the [facility] may also.

## 2019-02-02 NOTE — ED NOTES
.Pt D/C to home. VSS. D/C instructions given to patient. All questions answered and pt verbalizes understanding. Pt leaves ED with no acute changes, complaints or concerns. Pt ambulated out with a steady gait and all belongings.

## 2019-02-05 ENCOUNTER — TELEPHONE (OUTPATIENT)
Dept: MEDICAL GROUP | Facility: MEDICAL CENTER | Age: 29
End: 2019-02-05

## 2019-02-05 DIAGNOSIS — N63.0 BREAST LUMP IN FEMALE: ICD-10-CM

## 2019-02-05 NOTE — TELEPHONE ENCOUNTER
[facility] for [facility]. See ED records from 2/2/19. Pt states she spoke with the [facility] and they need the referral to come from pt's PCP. Please advise.

## 2019-02-27 ENCOUNTER — HOSPITAL ENCOUNTER (OUTPATIENT)
Dept: RADIOLOGY | Facility: MEDICAL CENTER | Age: 29
End: 2019-02-27
Attending: NURSE PRACTITIONER
Payer: MEDICAID

## 2019-02-27 DIAGNOSIS — N63.0 BREAST LUMP IN FEMALE: ICD-10-CM

## 2019-02-27 PROCEDURE — 76642 ULTRASOUND BREAST LIMITED: CPT | Mod: RT

## 2019-04-10 ENCOUNTER — APPOINTMENT (OUTPATIENT)
Dept: RADIOLOGY | Facility: MEDICAL CENTER | Age: 29
End: 2019-04-10
Attending: EMERGENCY MEDICINE
Payer: MEDICAID

## 2019-04-10 ENCOUNTER — HOSPITAL ENCOUNTER (EMERGENCY)
Facility: MEDICAL CENTER | Age: 29
End: 2019-04-11
Attending: EMERGENCY MEDICINE
Payer: MEDICAID

## 2019-04-10 ENCOUNTER — OFFICE VISIT (OUTPATIENT)
Dept: MEDICAL GROUP | Facility: MEDICAL CENTER | Age: 29
End: 2019-04-10
Attending: FAMILY MEDICINE
Payer: MEDICAID

## 2019-04-10 VITALS
HEART RATE: 100 BPM | RESPIRATION RATE: 14 BRPM | SYSTOLIC BLOOD PRESSURE: 120 MMHG | DIASTOLIC BLOOD PRESSURE: 80 MMHG | TEMPERATURE: 98 F | WEIGHT: 159 LBS | HEIGHT: 66 IN | OXYGEN SATURATION: 98 % | BODY MASS INDEX: 25.55 KG/M2

## 2019-04-10 VITALS
HEIGHT: 66 IN | HEART RATE: 72 BPM | OXYGEN SATURATION: 97 % | BODY MASS INDEX: 26.15 KG/M2 | WEIGHT: 162.7 LBS | DIASTOLIC BLOOD PRESSURE: 72 MMHG | RESPIRATION RATE: 18 BRPM | SYSTOLIC BLOOD PRESSURE: 117 MMHG | TEMPERATURE: 98.1 F

## 2019-04-10 DIAGNOSIS — Z80.3 FAMILY HISTORY OF BREAST CANCER IN FEMALE: ICD-10-CM

## 2019-04-10 DIAGNOSIS — R42 POSTURAL DIZZINESS: ICD-10-CM

## 2019-04-10 DIAGNOSIS — N63.0 BREAST LUMP IN UPPER INNER QUADRANT: ICD-10-CM

## 2019-04-10 DIAGNOSIS — H02.823 EYELID CYST, RIGHT: ICD-10-CM

## 2019-04-10 DIAGNOSIS — H53.9 CHANGE IN VISION: ICD-10-CM

## 2019-04-10 DIAGNOSIS — N83.209 CYST OF OVARY, UNSPECIFIED LATERALITY: ICD-10-CM

## 2019-04-10 DIAGNOSIS — G62.9 PERIPHERAL POLYNEUROPATHY: ICD-10-CM

## 2019-04-10 PROCEDURE — 99283 EMERGENCY DEPT VISIT LOW MDM: CPT | Mod: 25

## 2019-04-10 PROCEDURE — 99214 OFFICE O/P EST MOD 30 MIN: CPT | Performed by: FAMILY MEDICINE

## 2019-04-10 PROCEDURE — 70450 CT HEAD/BRAIN W/O DYE: CPT

## 2019-04-10 PROCEDURE — 99212 OFFICE O/P EST SF 10 MIN: CPT | Performed by: FAMILY MEDICINE

## 2019-04-10 RX ORDER — MECLIZINE HCL 12.5 MG/1
12.5 TABLET ORAL 3 TIMES DAILY PRN
Qty: 30 TAB | Refills: 0 | Status: SHIPPED | OUTPATIENT
Start: 2019-04-10 | End: 2019-04-23 | Stop reason: SDUPTHER

## 2019-04-10 ASSESSMENT — ENCOUNTER SYMPTOMS
NAUSEA: 0
NERVOUS/ANXIOUS: 1
CONSTIPATION: 0
DIARRHEA: 0
EYE REDNESS: 0
FEVER: 0
DOUBLE VISION: 0
DIZZINESS: 1
BLURRED VISION: 1
SPEECH CHANGE: 0
TREMORS: 0
SHORTNESS OF BREATH: 0
VOMITING: 0
COUGH: 0
TINGLING: 1
EYE PAIN: 0
SENSORY CHANGE: 0
FOCAL WEAKNESS: 0
BLOOD IN STOOL: 0
INSOMNIA: 0
CHILLS: 0
NECK PAIN: 0
HEADACHES: 0
PALPITATIONS: 0
EYE DISCHARGE: 0
PHOTOPHOBIA: 0
HALLUCINATIONS: 0
ABDOMINAL PAIN: 1
SPUTUM PRODUCTION: 0
DEPRESSION: 0
BACK PAIN: 0

## 2019-04-10 ASSESSMENT — LIFESTYLE VARIABLES: SUBSTANCE_ABUSE: 0

## 2019-04-10 NOTE — PROGRESS NOTES
Subjective:      Francois Quintero is a 28 y.o. female who presents with Establish Care and Orders Needed (mammo)            Patient 28-year-old female here to reestablish with the clinic today.    She has been having issues with a lump on her right breast.  She had an ultrasound already performed with inconclusive results.  She is requesting a mammogram done today so a mammogram will be ordered for her.  Since she has a family history of breast cancer in her grandmother who is now , a referral to general surgery will also be made for a further evaluation as to whether she has been palpating should be biopsied.  We will continue to follow.    She also has a history of a right-sided ovarian cyst that has caused her right-sided pelvic pain in the past.  She states that this problem is recurrent and worsened after she had her son recently.  A CT scan of her abdomen had been performed and no abdominal etiology of her right-sided pain has been found.  She will be referred to gynecologist for a further evaluation of this pelvic pain and ovarian cyst.  She has been advised to go back to the emergency room if she should have any sudden worsening of her current symptoms.    She is also noted occasional episodes of dizziness that occur with changes in position.  States that sometimes when she steps inside feels like she does not stop moving.  Her kids were recently sick and she has been congested off and on.  We will have her try nasal saline as needed for her congestion.  But for the dizziness we will also have her use meclizine 12.5 mg as needed up to 3 times daily.  She has been warned of potential side effects of the medication.  We will continue to follow.  She has been advised if she has any further worsening of her dizziness or other associated neurologic changes she should go to the emergency room for a further evaluation for a head CT scan.  We will continue to follow.    She also has been noticing  "that her vision has been worsening.  And on the eyelid on the medial aspect of her right eye she has noticed a lump that has been getting larger.  She does not think that it is affecting her vision unless she looks medially.  Since she has not seen an eye specialist sometime a referral to ophthalmology will be made today.  We will continue to follow.     Current medications, allergies, and problem list reviewed with patient and updated in EPIC.          Review of Systems   Constitutional: Negative for chills and fever.   HENT: Negative for hearing loss and tinnitus.    Eyes: Positive for blurred vision. Negative for double vision, photophobia, pain, discharge and redness.   Respiratory: Negative for cough, sputum production and shortness of breath.    Cardiovascular: Negative for chest pain and palpitations.   Gastrointestinal: Positive for abdominal pain. Negative for blood in stool, constipation, diarrhea, nausea and vomiting.   Genitourinary: Negative.    Musculoskeletal: Negative for back pain, joint pain and neck pain.   Skin: Negative for rash.   Neurological: Positive for dizziness and tingling. Negative for tremors, sensory change, speech change, focal weakness and headaches.   Psychiatric/Behavioral: Negative for depression, hallucinations, substance abuse and suicidal ideas. The patient is nervous/anxious. The patient does not have insomnia.           Objective:     /80 (BP Location: Left arm, Patient Position: Sitting)   Pulse 100   Temp 36.7 °C (98 °F)   Resp 14   Ht 1.676 m (5' 6\")   Wt 72.1 kg (159 lb)   SpO2 98%   BMI 25.66 kg/m²      Physical Exam   Constitutional: She is oriented to person, place, and time. She appears well-developed and well-nourished.   HENT:   Head: Normocephalic and atraumatic.   Eyes: Pupils are equal, round, and reactive to light. Conjunctivae and EOM are normal.   Neck: Normal range of motion. Neck supple.   Cardiovascular: Normal rate, regular rhythm and normal " heart sounds.  Exam reveals no friction rub.    No murmur heard.  Pulmonary/Chest: Effort normal and breath sounds normal. No respiratory distress. She has no wheezes. She has no rales.   Right medial chest wall   Abdominal: Soft. Bowel sounds are normal. She exhibits no distension. There is tenderness.   Right pelvis   Musculoskeletal: Normal range of motion.   Neurological: She is alert and oriented to person, place, and time.   Skin: Skin is warm and dry.   Psychiatric: She has a normal mood and affect. Her behavior is normal.   Nursing note and vitals reviewed.              Assessment/Plan:     1. Breast lump in upper inner quadrant  We will order a diagnostic mammogram, will refer to surgery for a further evaluation whether her breast lump should be biopsied.  We will continue to follow.  - MA-DIAGNOSTIC MAMMO W/O CAD-BILAT; Future  - REFERRAL TO GENERAL SURGERY    2. Family history of breast cancer in female  See above plan.  - MA-DIAGNOSTIC MAMMO W/O CAD-BILAT; Future  - REFERRAL TO GENERAL SURGERY    3. Eyelid cyst, right  Referral to ophthalmology will be made for further evaluation of her cyst on her eyelid as well as her changes in vision.  We will continue to follow.  - REFERRAL TO OPHTHALMOLOGY    4. Change in vision  See above plan.  - REFERRAL TO OPHTHALMOLOGY    5. Cyst of ovary, unspecified laterality  A referral to GYN will be made for history of right pelvic pain as well as her ovarian cyst.  We will continue to follow.  - REFERRAL TO GYNECOLOGY

## 2019-04-11 ENCOUNTER — TELEPHONE (OUTPATIENT)
Dept: MEDICAL GROUP | Facility: MEDICAL CENTER | Age: 29
End: 2019-04-11

## 2019-04-11 NOTE — ED NOTES
"Pt states that for the past three days she has had \"tingling\" in bilateral arms, legs and both sides of head/face. She states that the tingling comes and goes. Denies vision changes, loss of bowel/bladder or gait issues.  and leg strength equal bilaterally.   "

## 2019-04-11 NOTE — ED TRIAGE NOTES
"Chief Complaint   Patient presents with   • Tingling     pt c/o tingling in side of face, arm, and weist for past day, and c/o numbness to bilateral hands and feet today, and impaired balance for past 3 days     Pulse 82   Temp 36.6 °C (97.9 °F) (Temporal)   Resp 18   Ht 1.676 m (5' 6\")   Wt 73.8 kg (162 lb 11.2 oz)   LMP 04/03/2019   SpO2 97%   BMI 26.26 kg/m²       "

## 2019-04-11 NOTE — DISCHARGE INSTRUCTIONS
Take ibuprofen 600 mg 4 times a day.  Follow-up with your doctor if not better in 1-2 weeks.  Return for severe headache, facial droop, speech difficulty or localized weakness of an arm or leg.

## 2019-04-11 NOTE — ED PROVIDER NOTES
"ED Provider Note    CHIEF COMPLAINT  Chief Complaint   Patient presents with   • Tingling     pt c/o tingling in side of face, arm, and weist for past day, and c/o numbness to bilateral hands and feet today, and impaired balance for past 3 days       HPI  Francois Quintero is a 28 y.o. female who presents with tingling on her body for the last 4 days.  Symptoms were initially mild and intermittent and involve the head arms and legs.  Today she is had persistent hours long tingling or numbness of the scalp.  She also reports intermittent unsteadiness of gait.  Otherwise there is no focal weakness facial droop speech difficulty.  She does have some intermittent mild headaches.  Nasal congestion 2 weeks ago.  No fever or diarrhea.  No cough.    REVIEW OF SYSTEMS  Pertinent positives include: Numbness, tingling, possible ataxia, headache, recent URI.  Pertinent negatives include: Seizure, Dilantin use, pregnancy, fever, cough, diarrhea, vomiting.  10+ systems reviewed and negative.      PAST MEDICAL HISTORY  History reviewed. No pertinent past medical history.    FAMILY HISTORY  Family History   Problem Relation Age of Onset   • Cancer Paternal Uncle        SOCIAL HISTORY  Social History   Substance Use Topics   • Smoking status: Never Smoker   • Smokeless tobacco: Never Used   • Alcohol use No     History   Drug Use No       CURRENT MEDICATIONS  No current facility-administered medications for this encounter.      Current Outpatient Prescriptions   Medication Sig Dispense Refill   • meclizine (ANTIVERT) 12.5 MG Tab Take 1 Tab by mouth 3 times a day as needed for Dizziness. 30 Tab 0       ALLERGIES  No Known Allergies    PHYSICAL EXAM  VITAL SIGNS: Pulse 82   Temp 36.6 °C (97.9 °F) (Temporal)   Resp 18   Ht 1.676 m (5' 6\")   Wt 73.8 kg (162 lb 11.2 oz)   LMP 04/03/2019   SpO2 97%   BMI 26.26 kg/m²   Reviewed and normal  Constitutional: Well developed, Well nourished, well-appearing, cold under multiple " blankets, and mildly anxious.  HENT: Normocephalic, atraumatic, bilateral external ears normal, oropharynx moist, No exudates or erythema.   Eyes: PERRLA, conjunctiva pink, no scleral icterus.  No nystagmus.  No afferent pupil defect.  Cardiovascular: Regular S1-S2 without murmur, rub, gallop.  Respiratory: No rales, rhonchi, wheeze.  Gastrointestinal: Soft, nontender, nondistended.  Skin: No erythema, no rash.   Genitourinary:  No costovertebral angle tenderness.   Neurologic: Alert & oriented x 3, Cranial nerves II-XII are intact, Grasp, biceps, extensor hallucis longus, ankle plantar flexion are 5/5 and symmetric, no drift.  Finger nose finger and fine motor normal.  Romberg negative.  Mild unsteadiness with heel toe gait.  No gross ataxia.  Sensation is intact to light touch in all 4 limbs there is subjective dysesthesias.  No focal deficits noted.  Psychiatric: Affect normal, Judgment normal, Mood normal.     DIFFERENTIAL DIAGNOSIS:  Peripheral neuropathy, viral in syndrome induced neuropathy, doubt stroke, doubt intracranial hemorrhage, doubt tumor.      RADIOLOGY/PROCEDURES  CT-HEAD W/O   Final Result      Unremarkable noncontrast CT scan of the brain.        Indication head CT reported intermittent ataxia    COURSE & MEDICAL DECISION MAKING  This patient presents with an apparent poly-neuropathy which is sensory.  There is no evidence of brain tumor, stroke or hemorrhage..    This patient has borderline or elevated blood pressure as recorded above and was instructed to followup with primary physician for comprehensive blood pressure evaluation and yearly fasting cholesterol assessment according to to CMS protocol.    PLAN:    Ibuprofen 4 times daily for a week    Neuropathy handout given  Return for very headache, facial droop, speech difficulty, focal weakness    Kody Mane M.D.  [address] [place] [id number]  [phone]    Schedule an appointment as soon as possible for a visit   As needed if not  better 1-2 weeks      CONDITION: Stable.    FINAL IMPRESSION  1. Peripheral polyneuropathy (HCC)          Electronically signed by: Devan Eason, 4/10/2019 10:43 PM

## 2019-04-12 ENCOUNTER — HOSPITAL ENCOUNTER (EMERGENCY)
Facility: MEDICAL CENTER | Age: 29
End: 2019-04-12
Attending: EMERGENCY MEDICINE
Payer: MEDICAID

## 2019-04-12 VITALS
DIASTOLIC BLOOD PRESSURE: 67 MMHG | HEART RATE: 76 BPM | TEMPERATURE: 97.3 F | SYSTOLIC BLOOD PRESSURE: 113 MMHG | OXYGEN SATURATION: 100 % | RESPIRATION RATE: 19 BRPM

## 2019-04-12 DIAGNOSIS — E86.0 DEHYDRATION: ICD-10-CM

## 2019-04-12 DIAGNOSIS — G93.31 POST VIRAL SYNDROME: ICD-10-CM

## 2019-04-12 DIAGNOSIS — R19.7 DIARRHEA, UNSPECIFIED TYPE: ICD-10-CM

## 2019-04-12 DIAGNOSIS — G62.9 NEUROPATHY: ICD-10-CM

## 2019-04-12 LAB
ALBUMIN SERPL BCP-MCNC: 4.1 G/DL (ref 3.2–4.9)
ALBUMIN/GLOB SERPL: 1.5 G/DL
ALP SERPL-CCNC: 33 U/L (ref 30–99)
ALT SERPL-CCNC: 19 U/L (ref 2–50)
ANION GAP SERPL CALC-SCNC: 11 MMOL/L (ref 0–11.9)
APPEARANCE UR: CLEAR
AST SERPL-CCNC: 19 U/L (ref 12–45)
BACTERIA #/AREA URNS HPF: ABNORMAL /HPF
BASOPHILS # BLD AUTO: 1.3 % (ref 0–1.8)
BASOPHILS # BLD: 0.07 K/UL (ref 0–0.12)
BILIRUB SERPL-MCNC: 1 MG/DL (ref 0.1–1.5)
BILIRUB UR QL STRIP.AUTO: ABNORMAL
BUN SERPL-MCNC: 8 MG/DL (ref 8–22)
CALCIUM SERPL-MCNC: 8.7 MG/DL (ref 8.4–10.2)
CHLORIDE SERPL-SCNC: 104 MMOL/L (ref 96–112)
CO2 SERPL-SCNC: 21 MMOL/L (ref 20–33)
COLOR UR: YELLOW
CREAT SERPL-MCNC: 0.94 MG/DL (ref 0.5–1.4)
EOSINOPHIL # BLD AUTO: 0.38 K/UL (ref 0–0.51)
EOSINOPHIL NFR BLD: 7 % (ref 0–6.9)
EPI CELLS #/AREA URNS HPF: ABNORMAL /HPF
ERYTHROCYTE [DISTWIDTH] IN BLOOD BY AUTOMATED COUNT: 30 FL (ref 35.9–50)
GLOBULIN SER CALC-MCNC: 2.8 G/DL (ref 1.9–3.5)
GLUCOSE SERPL-MCNC: 91 MG/DL (ref 65–99)
GLUCOSE UR STRIP.AUTO-MCNC: NEGATIVE MG/DL
HCG SERPL QL: NEGATIVE
HCT VFR BLD AUTO: 37.7 % (ref 37–47)
HGB BLD-MCNC: 12.2 G/DL (ref 12–16)
IMM GRANULOCYTES # BLD AUTO: 0.01 K/UL (ref 0–0.11)
IMM GRANULOCYTES NFR BLD AUTO: 0.2 % (ref 0–0.9)
KETONES UR STRIP.AUTO-MCNC: >=80 MG/DL
LEUKOCYTE ESTERASE UR QL STRIP.AUTO: NEGATIVE
LYMPHOCYTES # BLD AUTO: 1.97 K/UL (ref 1–4.8)
LYMPHOCYTES NFR BLD: 36.1 % (ref 22–41)
MCH RBC QN AUTO: 20.6 PG (ref 27–33)
MCHC RBC AUTO-ENTMCNC: 32.4 G/DL (ref 33.6–35)
MCV RBC AUTO: 63.8 FL (ref 81.4–97.8)
MICRO URNS: ABNORMAL
MONOCYTES # BLD AUTO: 0.38 K/UL (ref 0–0.85)
MONOCYTES NFR BLD AUTO: 7 % (ref 0–13.4)
MUCOUS THREADS #/AREA URNS HPF: ABNORMAL /HPF
NEUTROPHILS # BLD AUTO: 2.64 K/UL (ref 2–7.15)
NEUTROPHILS NFR BLD: 48.4 % (ref 44–72)
NITRITE UR QL STRIP.AUTO: NEGATIVE
NRBC # BLD AUTO: 0 K/UL
NRBC BLD-RTO: 0 /100 WBC
PH UR STRIP.AUTO: 5.5 [PH]
PLATELET # BLD AUTO: 277 K/UL (ref 164–446)
PMV BLD AUTO: 10.2 FL (ref 9–12.9)
POTASSIUM SERPL-SCNC: 3.4 MMOL/L (ref 3.6–5.5)
PROT SERPL-MCNC: 6.9 G/DL (ref 6–8.2)
PROT UR QL STRIP: NEGATIVE MG/DL
RBC # BLD AUTO: 5.91 M/UL (ref 4.2–5.4)
RBC # URNS HPF: ABNORMAL /HPF
RBC UR QL AUTO: ABNORMAL
SODIUM SERPL-SCNC: 136 MMOL/L (ref 135–145)
SP GR UR STRIP.AUTO: 1.02
T4 FREE SERPL-MCNC: 1.3 NG/DL (ref 0.58–1.64)
TSH SERPL DL<=0.005 MIU/L-ACNC: 3.39 UIU/ML (ref 0.38–5.33)
WBC # BLD AUTO: 5.5 K/UL (ref 4.8–10.8)
WBC #/AREA URNS HPF: ABNORMAL /HPF

## 2019-04-12 PROCEDURE — 80053 COMPREHEN METABOLIC PANEL: CPT

## 2019-04-12 PROCEDURE — 700102 HCHG RX REV CODE 250 W/ 637 OVERRIDE(OP): Performed by: EMERGENCY MEDICINE

## 2019-04-12 PROCEDURE — 84443 ASSAY THYROID STIM HORMONE: CPT

## 2019-04-12 PROCEDURE — 700105 HCHG RX REV CODE 258: Performed by: EMERGENCY MEDICINE

## 2019-04-12 PROCEDURE — 81001 URINALYSIS AUTO W/SCOPE: CPT

## 2019-04-12 PROCEDURE — 84703 CHORIONIC GONADOTROPIN ASSAY: CPT

## 2019-04-12 PROCEDURE — 99284 EMERGENCY DEPT VISIT MOD MDM: CPT

## 2019-04-12 PROCEDURE — A9270 NON-COVERED ITEM OR SERVICE: HCPCS | Performed by: EMERGENCY MEDICINE

## 2019-04-12 PROCEDURE — 85025 COMPLETE CBC W/AUTO DIFF WBC: CPT

## 2019-04-12 PROCEDURE — 84439 ASSAY OF FREE THYROXINE: CPT

## 2019-04-12 PROCEDURE — 36415 COLL VENOUS BLD VENIPUNCTURE: CPT

## 2019-04-12 RX ORDER — ACETAMINOPHEN 325 MG/1
650 TABLET ORAL ONCE
Status: COMPLETED | OUTPATIENT
Start: 2019-04-12 | End: 2019-04-12

## 2019-04-12 RX ORDER — IBUPROFEN 600 MG/1
600 TABLET ORAL ONCE
Status: COMPLETED | OUTPATIENT
Start: 2019-04-12 | End: 2019-04-12

## 2019-04-12 RX ORDER — SODIUM CHLORIDE 9 MG/ML
1000 INJECTION, SOLUTION INTRAVENOUS ONCE
Status: COMPLETED | OUTPATIENT
Start: 2019-04-12 | End: 2019-04-12

## 2019-04-12 RX ADMIN — IBUPROFEN 600 MG: 600 TABLET ORAL at 02:34

## 2019-04-12 RX ADMIN — ACETAMINOPHEN 650 MG: 325 TABLET, FILM COATED ORAL at 02:34

## 2019-04-12 RX ADMIN — SODIUM CHLORIDE 1000 ML: 9 INJECTION, SOLUTION INTRAVENOUS at 02:33

## 2019-04-12 NOTE — TELEPHONE ENCOUNTER
Pt is requesting to add an U/S of her breast to go along with the [facility] Mammogram. Pt states she went home and did a self breast exam and agrees the Mammogram may not be able to capture the lump. Please advise.

## 2019-04-12 NOTE — ED TRIAGE NOTES
Chief Complaint   Patient presents with   • Diarrhea     started today with nausea this AM. Pt states that she just can't get warm and she feels like there are pins and neddels all over her body     /92   Pulse 93   Temp 36.7 °C (98.1 °F) (Oral)   Resp 18   LMP 04/03/2019   SpO2 99%

## 2019-04-15 ENCOUNTER — HOSPITAL ENCOUNTER (EMERGENCY)
Facility: MEDICAL CENTER | Age: 29
End: 2019-04-15
Attending: EMERGENCY MEDICINE
Payer: MEDICAID

## 2019-04-15 ENCOUNTER — TELEPHONE (OUTPATIENT)
Dept: MEDICAL GROUP | Facility: MEDICAL CENTER | Age: 29
End: 2019-04-15

## 2019-04-15 VITALS
RESPIRATION RATE: 16 BRPM | DIASTOLIC BLOOD PRESSURE: 83 MMHG | BODY MASS INDEX: 26.29 KG/M2 | HEART RATE: 108 BPM | TEMPERATURE: 96.7 F | WEIGHT: 163.58 LBS | OXYGEN SATURATION: 98 % | HEIGHT: 66 IN | SYSTOLIC BLOOD PRESSURE: 140 MMHG

## 2019-04-15 DIAGNOSIS — R20.2 PARESTHESIA: ICD-10-CM

## 2019-04-15 DIAGNOSIS — N63.0 BREAST MASS IN FEMALE: ICD-10-CM

## 2019-04-15 PROCEDURE — 99282 EMERGENCY DEPT VISIT SF MDM: CPT

## 2019-04-15 PROCEDURE — 99281 EMR DPT VST MAYX REQ PHY/QHP: CPT

## 2019-04-15 NOTE — TELEPHONE ENCOUNTER
1. Caller Name: Francois Quintero                                           Call Back Number: [phone] (home)         Patient approves a detailed voicemail message: N\A    Patient is requesting an order for mri following up from her 4/12/19 er visit. Please advise.

## 2019-04-16 ENCOUNTER — PATIENT OUTREACH (OUTPATIENT)
Dept: HEALTH INFORMATION MANAGEMENT | Facility: OTHER | Age: 29
End: 2019-04-16

## 2019-04-16 NOTE — ED TRIAGE NOTES
Francois Quintero  28 y.o.  Chief Complaint   Patient presents with   • Tingling     seen twice for this at [facility] last week, states now has developed burning and shooting pains from neck to top of head.  tingling is throughout entire body, mostly in head and neck     Patient ambulatory with steady gait to triage room with above complaint.  Patient appears in no obvious distress.  Patient symptoms are the same as previous visits but the change is the burning and radiation to her head.  Patient has had extensive workup the last two visits for this.      HPI from 4/12:  Patient is otherwise healthy 28-year-old female who presents emergency room for repeat evaluation of occasional pins and needle sensations on her skin and scalp.  She also reports that earlier today she had 3 episodes of severe diarrhea that spontaneously resolved.  She denies any abdominal pain, nausea ports no chest pain and notes that all of her skin sensations are waxing and waning in intensity.  She has not taken anything for these sensations and reports that there is occasional itchiness associated with it.  She reports a low-grade frontal headache that has developed over the last hour without vision changes.  Patient googled her symptoms online and is concerned that she may have MS or dehydration.  The patient has not seen her primary care physician for any of these symptoms.  Review of the prior chart from yesterday notes she had a polyneuropathy and did not necessitate any laboratory workup and was discharged with strict return precautions.    Patient escorted to the lobby and instructed to notify staff of any changes in condition.

## 2019-04-16 NOTE — ED NOTES
Patient given dc instructions and understands to follow up with referral, she ambulated out of department with steady gait.

## 2019-04-16 NOTE — ED NOTES
Patient ambulated from waiting room to B-16 with steady gait, no distress noted, Dr. Curry at bedside.

## 2019-04-16 NOTE — ED PROVIDER NOTES
ED Provider Note    CHIEF COMPLAINT  Chief Complaint   Patient presents with   • Tingling     seen twice for this at [facility] last week, states now has developed burning and shooting pains from neck to top of head.  tingling is throughout entire body, mostly in head and neck       HPI  Francois Quintero is a 28 y.o. female who presents with tingling in her scalp as well as throughout her body.  The patient states over the last month she has had spells of tingling in her scalp, face, and upper extremities.  She states she does have associated neck pain.  She did get over a cold a couple of weeks ago but otherwise has not had any fevers over last 24-48 hours.  The patient has been evaluated emerge department twice before as well as her primary care provider with no clear source.  She had a CT scan of the head that was negative and she also had a complete laboratory analysis with a CBC, metabolic panel, and thyroid studies all which were within normal limits.  The patient presents the emerge department as she states that she cannot tolerate the tingling in her face as well as her scalp and throughout her body.  She states occasionally she is had some weakness to her extremities which is bilateral.  There was some concern of MS and the patient is awaiting follow-up by her primary care doctor for possible outpatient MRI.    REVIEW OF SYSTEMS  See HPI for further details. All other systems are negative.     PAST MEDICAL HISTORY  No past medical history on file.    FAMILY HISTORY  [unfilled]    SOCIAL HISTORY  Social History     Social History   • Marital status: Single     Spouse name: N/A   • Number of children: N/A   • Years of education: N/A     Social History Main Topics   • Smoking status: Never Smoker   • Smokeless tobacco: Never Used   • Alcohol use No   • Drug use: No   • Sexual activity: Not on file     Other Topics Concern   • Not on file     Social History Narrative   • No narrative on file       SURGICAL  "HISTORY  No past surgical history on file.    CURRENT MEDICATIONS  Home Medications     Reviewed by Ricky Carroll R.N. (Registered Nurse) on 04/15/19 at 2248  Med List Status: Complete   Medication Last Dose Status   meclizine (ANTIVERT) 12.5 MG Tab  Active                ALLERGIES  No Known Allergies    PHYSICAL EXAM  VITAL SIGNS: /83   Pulse (!) 108   Temp 35.9 °C (96.7 °F) (Temporal)   Resp 16   Ht 1.676 m (5' 6\")   Wt 74.2 kg (163 lb 9.3 oz)   LMP 04/03/2019   SpO2 98%   BMI 26.40 kg/m²       Constitutional: Tearful but in no acute distress  HENT: Normocephalic, Atraumatic, Bilateral external ears normal, Oropharynx moist, No oral exudates, Nose normal.   Eyes: PERRLA, EOMI, Conjunctiva normal, No discharge.   Neck: Normal range of motion, No tenderness, Supple, No stridor.   Lymphatic: No lymphadenopathy noted.   Cardiovascular: Slightly tachycardic heart rate, Normal rhythm, No murmurs, No rubs, No gallops.   Thorax & Lungs: Normal breath sounds, No respiratory distress, No wheezing, No chest tenderness.   Abdomen: Bowel sounds normal, Soft, No tenderness, No masses, No pulsatile masses.   Skin: Warm, Dry, No erythema, No rash.   Back: No tenderness, No CVA tenderness.   Extremities: Intact distal pulses, No edema, No tenderness, No cyanosis, No clubbing.   Musculoskeletal: Good range of motion in all major joints. No tenderness to palpation or major deformities noted.   Neurologic: Alert & oriented x 3, Normal motor function, Normal sensory function, No focal deficits noted.   Psychiatric: Affect normal, Judgment normal, Mood normal.       COURSE & MEDICAL DECISION MAKING  Pertinent Labs & Imaging studies reviewed. (See chart for details)  This a 28-year-old female who presents the emerge department with diffuse paresthesias.  Due to the fact that it crosses the midline and intracranial source to be very unlikely.  From a multiple sclerosis standpoint this does not sound classic for MS but " this would still be in the differential.  As mentioned above the patient is Raúl had a thorough workup including a CT scan of the head that did not show any large mass she also had laboratory analysis that did not support a metabolic source in order to support any thyroid dysfunction as a source of her presenting symptoms.  The patient is neurologically intact at this time.  She does not appear toxic.  The patient will require neurologic consultation.  I do not see indication for emergent MRI imaging.  I will contact the [facility] department and have the patient get an appointment to see neurology for further outpatient workup.    FINAL IMPRESSION  1.  Diffuse paresthesias    Disposition  The patient will be discharged in stable condition         Electronically signed by: Atul Curry, 4/15/2019 11:17 PM

## 2019-04-23 ENCOUNTER — OFFICE VISIT (OUTPATIENT)
Dept: MEDICAL GROUP | Facility: MEDICAL CENTER | Age: 29
End: 2019-04-23
Attending: FAMILY MEDICINE
Payer: MEDICAID

## 2019-04-23 VITALS
OXYGEN SATURATION: 96 % | HEART RATE: 80 BPM | WEIGHT: 158 LBS | SYSTOLIC BLOOD PRESSURE: 118 MMHG | TEMPERATURE: 97.5 F | RESPIRATION RATE: 14 BRPM | HEIGHT: 66 IN | DIASTOLIC BLOOD PRESSURE: 60 MMHG | BODY MASS INDEX: 25.39 KG/M2

## 2019-04-23 DIAGNOSIS — H53.9 CHANGES IN VISION: ICD-10-CM

## 2019-04-23 DIAGNOSIS — R20.2 NUMBNESS AND TINGLING OF LEFT SIDE OF FACE: ICD-10-CM

## 2019-04-23 DIAGNOSIS — L65.9 HAIR THINNING: ICD-10-CM

## 2019-04-23 DIAGNOSIS — R20.0 NUMBNESS AND TINGLING OF UPPER AND LOWER EXTREMITIES OF BOTH SIDES: ICD-10-CM

## 2019-04-23 DIAGNOSIS — R51.9 FREQUENT HEADACHES: ICD-10-CM

## 2019-04-23 DIAGNOSIS — R20.2 NUMBNESS AND TINGLING OF UPPER AND LOWER EXTREMITIES OF BOTH SIDES: ICD-10-CM

## 2019-04-23 DIAGNOSIS — R20.0 NUMBNESS AND TINGLING OF LEFT SIDE OF FACE: ICD-10-CM

## 2019-04-23 PROCEDURE — 99213 OFFICE O/P EST LOW 20 MIN: CPT | Performed by: FAMILY MEDICINE

## 2019-04-23 PROCEDURE — 99214 OFFICE O/P EST MOD 30 MIN: CPT | Performed by: FAMILY MEDICINE

## 2019-04-23 RX ORDER — MECLIZINE HCL 12.5 MG/1
12.5 TABLET ORAL 3 TIMES DAILY PRN
Qty: 30 TAB | Refills: 0 | Status: SHIPPED | OUTPATIENT
Start: 2019-04-23 | End: 2019-05-24

## 2019-04-23 RX ORDER — SELENIUM SULFIDE 2.5 MG/100ML
LOTION TOPICAL
Qty: 1 BOTTLE | Refills: 3 | Status: SHIPPED | OUTPATIENT
Start: 2019-04-23 | End: 2019-05-24

## 2019-04-23 ASSESSMENT — ENCOUNTER SYMPTOMS
TINGLING: 1
DEPRESSION: 0
INSOMNIA: 0
CHILLS: 0
HALLUCINATIONS: 0
FEVER: 0
PALPITATIONS: 0
SENSORY CHANGE: 0
DOUBLE VISION: 0
NERVOUS/ANXIOUS: 1
SEIZURES: 0
PHOTOPHOBIA: 0
SPUTUM PRODUCTION: 0
MUSCULOSKELETAL NEGATIVE: 1
ABDOMINAL PAIN: 0
COUGH: 0
VOMITING: 0
BLURRED VISION: 1
FOCAL WEAKNESS: 0
EYE PAIN: 0
SPEECH CHANGE: 0
EYE DISCHARGE: 0
TREMORS: 0
HEADACHES: 1
NAUSEA: 0
SHORTNESS OF BREATH: 0

## 2019-04-23 ASSESSMENT — LIFESTYLE VARIABLES: SUBSTANCE_ABUSE: 0

## 2019-04-23 NOTE — PROGRESS NOTES
Subjective:      Francois Quintero is a 28 y.o. female who presents with Orders Needed (mri)            Patient 28-year-old female here for follow-up of recent ER visit for numbness and tingling in her extremities as well as face and frequent headaches changes in her vision.    The CT scan was performed with normal results.  She also had blood work done which did not show any significant changes.  Her thyroid function was within normal limits.  She was advised to follow-up with neurology for a further evaluation of her diffuse numbness and tingling as well as visual changes and headaches.  There is concerned about the possibility of MS causing these problems to occur.  She does state that the numbness and tingling does come and go as well as her visual changes.  Her headaches are usually frontal temporal but occur on a daily basis.  We will have her use her over-the-counter medications as directed for her headaches.  And a referral to neurology will be made today.  We will continue to follow.    She is also mentioned diffuse thinning of her hair that worsened after her last delivery.  She states that in the emergency room she was told that this could be related to a fungal infection of her scalp.  She does appear to have some dandruff flaking but the fitting of her hair is fairly uniform throughout her scalp, so a referral to dermatology will be made today.  Since she does appear to have some dandruff involved as well, we will have her try Selsun shampoo.  I will continue to follow.    She has an appointment for evaluation of her breast lump coming up next week.  She also will contact an ophthalmologist for her visual changes as well.  Referrals for these things have been made as well.  We will continue to follow.     Current medications, allergies, and problem list reviewed with patient and updated in EPIC.          Review of Systems   Constitutional: Negative for chills and fever.   HENT: Negative for  "hearing loss and tinnitus.    Eyes: Positive for blurred vision. Negative for double vision, photophobia, pain and discharge.   Respiratory: Negative for cough, sputum production and shortness of breath.    Cardiovascular: Negative for chest pain and palpitations.   Gastrointestinal: Negative for abdominal pain, nausea and vomiting.   Musculoskeletal: Negative.    Skin: Negative for rash.   Neurological: Positive for tingling and headaches. Negative for tremors, sensory change, speech change, focal weakness and seizures.   Psychiatric/Behavioral: Negative for depression, hallucinations, substance abuse and suicidal ideas. The patient is nervous/anxious. The patient does not have insomnia.           Objective:     /60 (BP Location: Left arm, Patient Position: Sitting)   Pulse 80   Temp 36.4 °C (97.5 °F)   Resp 14   Ht 1.676 m (5' 6\")   Wt 71.7 kg (158 lb)   LMP 04/03/2019   SpO2 96%   BMI 25.50 kg/m²      Physical Exam   Constitutional: She is oriented to person, place, and time.   BMI 25.50   HENT:   Head: Normocephalic and atraumatic.   Nose: Nose normal.   Mouth/Throat: Oropharynx is clear and moist.   Eyes: Pupils are equal, round, and reactive to light. Conjunctivae and EOM are normal.   Neck: Normal range of motion. Neck supple.   Cardiovascular: Normal rate, regular rhythm and normal heart sounds.  Exam reveals no friction rub.    No murmur heard.  Pulmonary/Chest: Effort normal and breath sounds normal. No respiratory distress. She has no wheezes. She has no rales.   Abdominal: Soft. Bowel sounds are normal. She exhibits no distension. There is no tenderness.   Musculoskeletal: Normal range of motion.   Neurological: She is alert and oriented to person, place, and time. She displays normal reflexes. No cranial nerve deficit. Coordination normal.   Skin: Skin is warm and dry. No rash noted. No erythema.   Diffuse uniform hair thinning, with some flaking of scalp   Psychiatric: Her behavior is " normal.   Slight distressed appearance   Nursing note and vitals reviewed.              Assessment/Plan:     1. Numbness and tingling of upper and lower extremities of both sides  Reviewed the results of her recent CT scan as well as blood work.  A referral to neurology was made today for a further assessment of her bilateral numbness and tingling as well as numbness and tingling of her face, visual changes and frequent headaches.  ER precautions have been given if she has any sudden worsening of any of these conditions.  - REFERRAL TO NEUROLOGY    2. Numbness and tingling of left side of face  See above plan.  - REFERRAL TO NEUROLOGY    3. Changes in vision  See above plan.  - REFERRAL TO NEUROLOGY    4. Frequent headaches  See above plan.  - REFERRAL TO NEUROLOGY    5. Hair thinning  A referral to dermatology has been made but also will have patient try selenium sulfide shampoo 2.5% to be used as directed.  We will continue to follow.  - REFERRAL TO DERMATOLOGY  - selenium sulfide 2.5% (SELSUN) 2.5 % Lotion; Use as directed  Dispense: 1 Bottle; Refill: 3

## 2019-04-26 ENCOUNTER — HOSPITAL ENCOUNTER (OUTPATIENT)
Dept: RADIOLOGY | Facility: MEDICAL CENTER | Age: 29
End: 2019-04-26
Attending: FAMILY MEDICINE
Payer: MEDICAID

## 2019-04-26 DIAGNOSIS — Z80.3 FAMILY HISTORY OF BREAST CANCER IN FEMALE: ICD-10-CM

## 2019-04-26 DIAGNOSIS — N63.0 BREAST LUMP IN UPPER INNER QUADRANT: ICD-10-CM

## 2019-04-26 PROCEDURE — 76642 ULTRASOUND BREAST LIMITED: CPT | Mod: RT

## 2019-04-30 ENCOUNTER — GYNECOLOGY VISIT (OUTPATIENT)
Dept: OBGYN | Facility: CLINIC | Age: 29
End: 2019-04-30
Payer: MEDICAID

## 2019-04-30 VITALS
BODY MASS INDEX: 25.55 KG/M2 | HEIGHT: 66 IN | SYSTOLIC BLOOD PRESSURE: 120 MMHG | WEIGHT: 159 LBS | DIASTOLIC BLOOD PRESSURE: 70 MMHG

## 2019-04-30 DIAGNOSIS — R10.31 RIGHT LOWER QUADRANT ABDOMINAL PAIN: ICD-10-CM

## 2019-04-30 PROCEDURE — 99214 OFFICE O/P EST MOD 30 MIN: CPT | Performed by: OBSTETRICS & GYNECOLOGY

## 2019-04-30 NOTE — NON-PROVIDER
Patient here for gyn exam. Ovarian cyst seen at Er visit in 1/2019  Last pap done/result: 2018=negative at [facility]  LMP=4/27/19  Regular periods  BCM: none  Last mammogram, if applicable:  C/o pain on R side of lower abdomen

## 2019-04-30 NOTE — PROGRESS NOTES
Chief complaint;    Francois Quintero is a 28 y.o.  who presents complaining of right lower quadrant pain for the last year.  Pain is intermittent.  The patient was seen in the emergency department 2019 had CT scan of the abdomen and pelvis which was normal (no evidence of adnexal masses, no free pelvic fluid) but the patient was told by the ER physician that she had a right ovarian cyst and was told to follow-up with gynecology.  The patient is currently on her menstrual cycle she states that her pain lasts a few minutes at a time.  Pain is sharp in nature.  Denies dyspareunia.  She has regular menstrual cycles.  Patient is status post  6 months ago.  Not currently using birth control.    Review of systems; denies fever chills abdominal pain, denies chest pain shortness of breath or urinary symptoms  Past medical history-  Past Medical History:   Diagnosis Date   • Asthma      Past surgical history-History reviewed. No pertinent surgical history.  Allergies-Patient has no known allergies.  Medications-  Current Outpatient Prescriptions on File Prior to Visit   Medication Sig Dispense Refill   • selenium sulfide 2.5% (SELSUN) 2.5 % Lotion Use as directed (Patient not taking: Reported on 2019) 1 Bottle 3   • meclizine (ANTIVERT) 12.5 MG Tab Take 1 Tab by mouth 3 times a day as needed for Dizziness. (Patient not taking: Reported on 2019) 30 Tab 0     No current facility-administered medications on file prior to visit.      Social history-  Social History     Social History   • Marital status: Single     Spouse name: N/A   • Number of children: N/A   • Years of education: N/A     Occupational History   • Not on file.     Social History Main Topics   • Smoking status: Never Smoker   • Smokeless tobacco: Never Used   • Alcohol use No   • Drug use: No   • Sexual activity: Not on file     Other Topics Concern   • Not on file     Social History Narrative   • No narrative on file     Past  "Family History-no history of breast or ovarian cancer    Physical examination;  Alert and oriented x3  General a thin well-developed well-nourished female in no apparent distress  Vitals:    04/30/19 0934   BP: 120/70   BP Location: Right arm   Patient Position: Sitting   Weight: 72.1 kg (159 lb)   Height: 1.676 m (5' 6\")     Skin is warm and dry  Neck-supple  HEENT-head-atraumatic, normocephalic, EOMI, PERRLA  Cardiovascular-regular rate and rhythm, normal S1-S2, no murmurs or gallops  Lungs-clear to auscultation bilaterally  Back-negative CVA tenderness  Abdomen-nondistended positive bowel sounds soft nontender no masses or hepatosplenomegaly  Pelvic examination;  External genitalia-no visible lesions   Vagina-no blood or discharge  Cervix-no gross lesions  Uterus-normal size and shape, nontender  Adnexa without mass or tenderness  Extremities without cyanosis clubbing or edema  Neurologic exam grossly intact    Impression;  Right lower quadrant pain without gynecologic pathology    Plan;  Follow-up with primary care physician for further work-up.  Ibuprofen as needed.      25 Minutes spent with the patient in face-to-face contact, greater than 50% of the time spent on counseling and coordination of care. All questions answered in detail.  "

## 2019-05-12 ENCOUNTER — HOSPITAL ENCOUNTER (EMERGENCY)
Dept: HOSPITAL 8 - ED | Age: 29
Discharge: HOME | End: 2019-05-12
Payer: MEDICAID

## 2019-05-12 VITALS — BODY MASS INDEX: 25.65 KG/M2 | HEIGHT: 66 IN | WEIGHT: 159.61 LBS

## 2019-05-12 VITALS — SYSTOLIC BLOOD PRESSURE: 10 MMHG | DIASTOLIC BLOOD PRESSURE: 67 MMHG

## 2019-05-12 DIAGNOSIS — R20.2: ICD-10-CM

## 2019-05-12 DIAGNOSIS — G44.201: Primary | ICD-10-CM

## 2019-05-12 LAB
ALBUMIN SERPL-MCNC: 3.9 G/DL (ref 3.4–5)
ANION GAP SERPL CALC-SCNC: 1 MMOL/L (ref 5–15)
BASOPHILS # BLD AUTO: 0.04 X10^3/UL (ref 0–0.1)
BASOPHILS NFR BLD AUTO: 1 % (ref 0–1)
CALCIUM SERPL-MCNC: 8.5 MG/DL (ref 8.5–10.1)
CHLORIDE SERPL-SCNC: 108 MMOL/L (ref 98–107)
CREAT SERPL-MCNC: 0.99 MG/DL (ref 0.55–1.02)
EOSINOPHIL # BLD AUTO: 0.48 X10^3/UL (ref 0–0.4)
EOSINOPHIL NFR BLD AUTO: 9 % (ref 1–7)
ERYTHROCYTE [DISTWIDTH] IN BLOOD BY AUTOMATED COUNT: 13.8 % (ref 9.6–15.2)
LYMPHOCYTES # BLD AUTO: 2.2 X10^3/UL (ref 1–3.4)
LYMPHOCYTES NFR BLD AUTO: 42 % (ref 22–44)
MCH RBC QN AUTO: 21 PG (ref 27–34.8)
MCHC RBC AUTO-ENTMCNC: 32 G/DL (ref 32.4–35.8)
MCV RBC AUTO: 65.6 FL (ref 80–100)
MD: NO
MONOCYTES # BLD AUTO: 0.4 X10^3/UL (ref 0.2–0.8)
MONOCYTES NFR BLD AUTO: 8 % (ref 2–9)
NEUTROPHILS # BLD AUTO: 2.11 X10^3/UL (ref 1.8–6.8)
NEUTROPHILS NFR BLD AUTO: 40 % (ref 42–75)
PLATELET # BLD AUTO: 276 X10^3/UL (ref 130–400)
PMV BLD AUTO: 9.4 FL (ref 7.4–10.4)
RBC # BLD AUTO: 5.9 X10^6/UL (ref 3.82–5.3)

## 2019-05-12 PROCEDURE — 72125 CT NECK SPINE W/O DYE: CPT

## 2019-05-12 PROCEDURE — 84703 CHORIONIC GONADOTROPIN ASSAY: CPT

## 2019-05-12 PROCEDURE — 85025 COMPLETE CBC W/AUTO DIFF WBC: CPT

## 2019-05-12 PROCEDURE — 99284 EMERGENCY DEPT VISIT MOD MDM: CPT

## 2019-05-12 PROCEDURE — 82040 ASSAY OF SERUM ALBUMIN: CPT

## 2019-05-12 PROCEDURE — 36415 COLL VENOUS BLD VENIPUNCTURE: CPT

## 2019-05-12 PROCEDURE — 80048 BASIC METABOLIC PNL TOTAL CA: CPT

## 2019-05-12 PROCEDURE — 93005 ELECTROCARDIOGRAM TRACING: CPT

## 2019-05-12 PROCEDURE — 84443 ASSAY THYROID STIM HORMONE: CPT

## 2019-05-24 ENCOUNTER — OFFICE VISIT (OUTPATIENT)
Dept: NEUROLOGY | Facility: MEDICAL CENTER | Age: 29
End: 2019-05-24
Payer: MEDICAID

## 2019-05-24 VITALS
SYSTOLIC BLOOD PRESSURE: 102 MMHG | BODY MASS INDEX: 25.91 KG/M2 | WEIGHT: 161.2 LBS | OXYGEN SATURATION: 95 % | HEIGHT: 66 IN | TEMPERATURE: 97.2 F | HEART RATE: 63 BPM | RESPIRATION RATE: 14 BRPM | DIASTOLIC BLOOD PRESSURE: 68 MMHG

## 2019-05-24 DIAGNOSIS — M54.81 OCCIPITAL NEURALGIA OF RIGHT SIDE: ICD-10-CM

## 2019-05-24 DIAGNOSIS — R20.2 PARESTHESIAS: Primary | ICD-10-CM

## 2019-05-24 DIAGNOSIS — G25.3 GENERALIZED MYOKYMIA: ICD-10-CM

## 2019-05-24 PROCEDURE — 99205 OFFICE O/P NEW HI 60 MIN: CPT | Performed by: PSYCHIATRY & NEUROLOGY

## 2019-05-24 RX ORDER — NICOTINE POLACRILEX 2 MG
GUM BUCCAL
COMMUNITY
End: 2022-08-18

## 2019-05-24 RX ORDER — IBUPROFEN 200 MG
200 TABLET ORAL EVERY 6 HOURS PRN
COMMUNITY
End: 2022-08-18

## 2019-05-24 ASSESSMENT — PAIN SCALES - GENERAL: PAINLEVEL: NO PAIN

## 2019-05-24 ASSESSMENT — PATIENT HEALTH QUESTIONNAIRE - PHQ9: CLINICAL INTERPRETATION OF PHQ2 SCORE: 0

## 2019-05-24 ASSESSMENT — ENCOUNTER SYMPTOMS
TINGLING: 1
LOSS OF CONSCIOUSNESS: 0
HEADACHES: 1
CONSTIPATION: 0
TREMORS: 0
DIZZINESS: 0
SENSORY CHANGE: 1
DOUBLE VISION: 0
PHOTOPHOBIA: 0
NECK PAIN: 1
FOCAL WEAKNESS: 0

## 2019-05-25 NOTE — PROGRESS NOTES
Subjective:      Francois Quintero is a 29 y.o. female who presents from the office of Dr. Mane, for consultation, with a several month history of episodic and fluctuating tingling sensations, muscle twitching and right-sided neck pain with headache.     HPI    The patient is a pleasant 29-year-old left-handed female whose symptoms started spontaneously as episodic tingling, she describes as pins-and-needles sensation.  It felt to her both inside as well as on the surface of the skin.  Initially on the left cheek and around the mouth, it began to move and would episodically occur over the neck, and then eventually at various locations over her body.  This could include the back, and any of the extremities.  The onset would be random, they would last a few minutes and then resolved without sequelae.  She was never able to provoke an episode.  These now occur on a daily basis.  There is no change in underlying skin texture, turgor, or color.    She then began to notice twitching in the muscles around the eyes and mouth.  This is now occurring randomly in the upper extremities, there is no movement of the extremity or muscle itself, it is never painful, she denies sustained dystonia in the muscles involved such as blepharospasm, hemifacial spasm, or cervical rotation.  These are brief, independent of the sensory episodes, but they have become a regular occurrence.    She also describes right-sided neck pain with tenderness at the base of the skull and which can radiate over the occiput and vertex.  The neck becomes very stiffened,  these occur on a daily basis, but are still random in onset, independent of the above symptoms.  She has tried high-dose ibuprofen and Excedrin Migraine, both with little benefit.  On an occasion this can radiate into the right shoulder.  With this there has been some ipsilateral tinnitus without hearing loss.    She denies any focal motor or sensory loss, imbalance, changes in bowel  "or bladder function, there meets phenomena, generalized malaise or fatigue, cognitive impairment, double vision or loss of vision.    Review of the electronic health record indicates she has been in the emergency room on 3 different occasions in the last 6 weeks, all for the same symptoms.  CT scan of the brain was normal as well as CMP, TSH and CBC.    She denies any recent mosquito bites or tick bites, she is not spending a lot of time outdoors, she was not taking any new medications, there was no trauma to the head or neck, she has not traveled recently.  No one in the house has had a history of similar symptoms, they have not been fumigating or remodeling.    She has no medical history of note, denying migraine, seizure, MS, diabetes, thyroid disease, malignancy, blood dyscrasia or autoimmune disease.  There is no surgical history of note.    Her menses are once a month with a 5-day duration, this has no effect on her primary complaints.  Her mother has a history of stroke and hypertension, her father and her 3 siblings are all alive and well.  Her 7-month-year-old son is not complaining of much, her 4-year-old daughter is quite happy.  She does not smoke or drink.  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  She is on no medications.    Review of Systems   Constitutional: Negative for malaise/fatigue.   Eyes: Negative for double vision and photophobia.   Gastrointestinal: Negative for constipation.   Genitourinary: Negative for dysuria.   Musculoskeletal: Positive for neck pain.   Neurological: Positive for tingling, sensory change and headaches. Negative for dizziness, tremors, focal weakness and loss of consciousness.   All other systems reviewed and are negative.       Objective:     /68 (BP Location: Left arm, Patient Position: Sitting)   Pulse 63   Temp 36.2 °C (97.2 °F) (Temporal)   Resp 14   Ht 1.676 m (5' 6\")   Wt 73.1 kg (161 lb 3.2 oz)   LMP 04/27/2019   SpO2 95%   BMI 26.02 kg/m²      Physical Exam "     She appears in no acute distress.  Her vital signs are stable.  There is no malar rash, jaw or temporal tenderness, jaw claudication, or allodynia.  There is notable tenderness of the occipital ridge and notch on the right, direct compression of the latter does result in pain radiating up the occipital cranial bone.  The neck is supple, range of motion is full, there meets phenomena is absent, compression maneuvers are negative.  Carotid pulses are present bilaterally without asymmetry or bruits.  Cardiac evaluation reveals a regular rhythm.  There is no evidence of diffuse rash, joint swelling or tenderness, or lower extremity edema.    Fully oriented, there is no aphasia, apraxia, or inattention.    PERRLA/EOMI, visual fields are full to finger counting on confrontation, funduscopic exam bilaterally reveals sharp disc margins without pallor, facial movements are symmetric, sensory exam is intact to temperature and light touch bilaterally, the tongue and uvula are midline, jaw jerk is absent, shoulder shrug and head rotation are intact and symmetric.  There are no involuntary movements of the facial or neck musculatures.    Tone is normal throughout, there is no tremor, asterixis, or drift.  There is no dystonia, fasciculations seen, there is no atrophy in any focal muscle group.  Strength is intact throughout.  Reflexes are brisk and present at all points without asymmetries, none are dropped, both toes are downgoing.    Coordination reveals no appendicular or truncal dystaxia, she walks with normal station, heel, toe, and tandem walking are all done without issue.  Fine motor control and repetitive movements are intact and symmetric with normal amplitude and frequencies bilaterally.    Sensory exam is intact to vibration, temperature, pinprick and JPS.  Romberg is absent.       Assessment/Plan:     1. Paresthesias  Most likely a benign process, her concern has centered on MS, the history is certainly not  consistent with a disorder, it is even less likely with a normal neurologic.  Still, for thoroughness, MRI of the brain with and without contrast will be ordered.  I doubt that this is an autoimmune process though I will defer these impressions and work-ups to Dr. Mane if he feels it appropriate.  I reviewed all of this with her.  She was told very clearly that a neurologic diagnosis probably will not be found, and thus accurate prognostication can be made.  I certainly will be able to tell her what it is not, and in this setting, not likely to progress.    - MR-BRAIN-WITH & W/O; Future    2. Generalized myokymia  This is most likely benign as well, not presenting sign of demyelinating disease.  Why this presented with the benign paresthesias is not clear to me.  As with the paresthesias, we may never have an answer as to these spontaneous involuntary movements.  They are not associated with diseases such as neuromuscular disease, myopathic disease, etc.    - MR-BRAIN-WITH & W/O; Future    3. Occipital neuralgia of right side  On the other hand, I suspect that this is a real issue for her, the occipital nerve being inflamed and compressed intermittently resulting in radiating pain.  She would probably benefit from an occipital nerve block at some point, but I first recommended a standing daily use of an anti-inflammatory such as high-dose naproxen.  She has already failed ibuprofen.  This is not related to the myokymia and paresthesias above.    We will call her with the MRI results as they come in, she can follow-up with me in the office as needed into the future if her occipital neuralgia begins to act up.    Time: 60 minutes spent face-to-face for exam, review, discussion, and education, of this over 50% of the time spent counseling and coordinating care.

## 2019-06-06 ENCOUNTER — OFFICE VISIT (OUTPATIENT)
Dept: MEDICAL GROUP | Facility: MEDICAL CENTER | Age: 29
End: 2019-06-06
Attending: FAMILY MEDICINE
Payer: MEDICAID

## 2019-06-06 VITALS
SYSTOLIC BLOOD PRESSURE: 120 MMHG | WEIGHT: 160 LBS | DIASTOLIC BLOOD PRESSURE: 60 MMHG | OXYGEN SATURATION: 99 % | TEMPERATURE: 97.7 F | RESPIRATION RATE: 16 BRPM | BODY MASS INDEX: 25.71 KG/M2 | HEART RATE: 80 BPM | HEIGHT: 66 IN

## 2019-06-06 DIAGNOSIS — J34.2 DEVIATED SEPTUM: ICD-10-CM

## 2019-06-06 DIAGNOSIS — G62.9 NEUROPATHY: ICD-10-CM

## 2019-06-06 DIAGNOSIS — R07.89 RIGHT-SIDED CHEST WALL PAIN: ICD-10-CM

## 2019-06-06 PROBLEM — Z3A.17 17 WEEKS GESTATION OF PREGNANCY: Status: RESOLVED | Noted: 2018-05-23 | Resolved: 2019-06-06

## 2019-06-06 PROBLEM — R52 PAIN RELATED TO VAGINAL DELIVERY: Status: RESOLVED | Noted: 2018-10-25 | Resolved: 2019-06-06

## 2019-06-06 PROCEDURE — 99214 OFFICE O/P EST MOD 30 MIN: CPT | Performed by: FAMILY MEDICINE

## 2019-06-06 PROCEDURE — 99213 OFFICE O/P EST LOW 20 MIN: CPT | Performed by: FAMILY MEDICINE

## 2019-06-06 ASSESSMENT — ENCOUNTER SYMPTOMS
BACK PAIN: 1
SPUTUM PRODUCTION: 0
SPEECH CHANGE: 0
ABDOMINAL PAIN: 0
TINGLING: 1
PALPITATIONS: 0
SHORTNESS OF BREATH: 0
SENSORY CHANGE: 0
HEADACHES: 0
TREMORS: 0
NAUSEA: 0
NECK PAIN: 1
HALLUCINATIONS: 0
VOMITING: 0
DEPRESSION: 0
FOCAL WEAKNESS: 0
COUGH: 0
FEVER: 0
CHILLS: 0
NERVOUS/ANXIOUS: 1

## 2019-06-06 ASSESSMENT — LIFESTYLE VARIABLES: SUBSTANCE_ABUSE: 0

## 2019-06-06 NOTE — PROGRESS NOTES
Subjective:      Francois Quintero is a 29 y.o. female who presents with No chief complaint on file.            Patient 29-year-old female here for follow-up of her neuropathy, deviated septum, right-sided chest wall pain, and right lower quadrant abdominal discomfort.    Patient states that she had injured her nose when she was a child and ever since then has been having difficulty breathing out of the right side of her nose.  Recently she started noticing that she has been more congested on the right side as well as having tenderness over the right maxillary sinuses and headaches, and she is also been having some discomfort in her throat also on the right side.  She has not had any recent fevers or other upper respiratory symptoms.  She wishes to be referred to a ear nose and throat specialist due to the slight deviation of her nasal septum to the right side.  A referral will be made today.  We will continue to follow.    She is also been having chest pain on the right side of her chest worsened with turning or picking up her child.  Patient states that this started occurring about 4 months after her delivery.  Explained to her that this may be related to actually having to use her upper body strength to  her baby.  She had a breast ultrasound which did not show any significant changes she was concerned about a breast lump over the same area.  A referral to physical therapy will be made today for a further evaluation as well as assistance in management of her right-sided chest wall pain.  We will have her continue using her over-the-counter medications as directed.  We will continue to follow.    She is also been having numbness and tingling and recently a cold sensation over the right side of her neck and radiating down her right arm.  She states that she is currently seeing a neurologist for this problem.  She had concerns about MS, so her neurologist has ordered a brain CT scan for her.  She has  not yet gotten the scan done, so will have her get the test done and follow-up with neurology.  Discussed the possibility that the cold sensation on the back of her neck may also be related to the numbness and tingling she has been having already.  We will continue to follow.    She also had right-sided abdominal pain and a CT scan of her abdomen had been done.  There were no acute changes on CT, and patient states that she had been told by the emergency room physician that it might be an ovarian cyst.  She had been referred to GYN and has seen the GYN.  She states that her GYN did not order any ultrasounds or other tests and stated that there was no cyst present.  She has been advised that she does develop any further discomfort on that side of her body we could reorder an ultrasound to further assess.  She is not currently having any discomfort on the right lower quadrant.  No other systemic symptoms as well.  ER precautions have been given to patient if she should develop any sudden worsening of her right-sided abdominal pain.  We will continue to follow.     Current medications, allergies, and problem list reviewed with patient and updated in EPIC.          Review of Systems   Constitutional: Negative for chills and fever.   HENT: Negative for hearing loss and tinnitus.    Respiratory: Negative for cough, sputum production and shortness of breath.    Cardiovascular: Negative for chest pain and palpitations.        Right-sided chest wall pain   Gastrointestinal: Negative for abdominal pain, nausea and vomiting.   Musculoskeletal: Positive for back pain and neck pain.   Skin: Negative for rash.   Neurological: Positive for tingling. Negative for tremors, sensory change, speech change, focal weakness and headaches.   Psychiatric/Behavioral: Negative for depression, hallucinations, substance abuse and suicidal ideas. The patient is nervous/anxious.           Objective:     /60 (BP Location: Left arm, Patient  "Position: Sitting)   Pulse 80   Temp 36.5 °C (97.7 °F)   Resp 16   Ht 1.676 m (5' 6\")   Wt 72.6 kg (160 lb)   SpO2 99%   BMI 25.82 kg/m²      Physical Exam   Constitutional: She is oriented to person, place, and time.   BMI 25.82   HENT:   Head: Normocephalic and atraumatic.   Nose: Nose normal.   Mouth/Throat: Oropharynx is clear and moist.   Neck: Normal range of motion. Neck supple.   Cardiovascular: Normal rate, regular rhythm and normal heart sounds.  Exam reveals no friction rub.    No murmur heard.  Pulmonary/Chest: Effort normal and breath sounds normal. No respiratory distress. She has no wheezes. She has no rales. She exhibits tenderness.   Right-sided chest wall   Abdominal: Soft. Bowel sounds are normal. She exhibits no distension. There is no tenderness.   Musculoskeletal: She exhibits tenderness.   Neurological: She is alert and oriented to person, place, and time.   Skin: Skin is warm and dry. No erythema.   Psychiatric: She has a normal mood and affect. Her behavior is normal.   Nursing note and vitals reviewed.              Assessment/Plan:     1. Deviated septum  A referral to ENT has been made for her possible deviated septum.  Patient has been having difficulty breathing out of the right side of her nose.  We will continue to follow.  - REFERRAL TO ENT    2. Right-sided chest wall pain  We will have her referred to physical therapy for additional assistance in management of her right-sided chest wall pain.  Patient states that it hurts more when she twists her body or picks up her baby.  We will continue to follow.  - REFERRAL TO PHYSICAL THERAPY Reason for Therapy: Eval/Treat/Report    3. Neuropathy (HCC)  We will have her continue to follow-up with Dr. Murray for her work-up in regards to her numbness tingling and cold sensation on the backside of her right arm and neck.  She has an MRI scheduled.  We will continue to follow.      "

## 2019-06-11 ENCOUNTER — APPOINTMENT (OUTPATIENT)
Dept: RADIOLOGY | Facility: MEDICAL CENTER | Age: 29
End: 2019-06-11
Attending: PSYCHIATRY & NEUROLOGY
Payer: MEDICAID

## 2019-06-11 DIAGNOSIS — G25.3 GENERALIZED MYOKYMIA: ICD-10-CM

## 2019-06-11 DIAGNOSIS — R20.2 PARESTHESIAS: ICD-10-CM

## 2019-06-11 PROCEDURE — A9585 GADOBUTROL INJECTION: HCPCS | Performed by: PSYCHIATRY & NEUROLOGY

## 2019-06-11 PROCEDURE — 70553 MRI BRAIN STEM W/O & W/DYE: CPT

## 2019-06-11 PROCEDURE — 700117 HCHG RX CONTRAST REV CODE 255: Performed by: PSYCHIATRY & NEUROLOGY

## 2019-06-11 RX ORDER — GADOBUTROL 604.72 MG/ML
7.5 INJECTION INTRAVENOUS ONCE
Status: COMPLETED | OUTPATIENT
Start: 2019-06-11 | End: 2019-06-11

## 2019-06-11 RX ADMIN — GADOBUTROL 7.5 ML: 604.72 INJECTION INTRAVENOUS at 10:38

## 2019-06-12 ENCOUNTER — TELEPHONE (OUTPATIENT)
Dept: MEDICAL GROUP | Facility: MEDICAL CENTER | Age: 29
End: 2019-06-12

## 2019-06-12 NOTE — TELEPHONE ENCOUNTER
1. Caller Name: Francois Quintero                                           Call Back Number: [phone] (home)         Patient approves a detailed voicemail message: N\A    Patient is requesting results from her mri. Please advise.

## 2019-07-19 ENCOUNTER — HOSPITAL ENCOUNTER (OUTPATIENT)
Dept: RADIOLOGY | Facility: MEDICAL CENTER | Age: 29
End: 2019-07-19

## 2019-07-19 ENCOUNTER — HOSPITAL ENCOUNTER (OUTPATIENT)
Dept: RADIOLOGY | Facility: MEDICAL CENTER | Age: 29
End: 2019-07-19
Attending: OTOLARYNGOLOGY
Payer: MEDICAID

## 2019-07-19 DIAGNOSIS — R49.8 NEUROLOGIC VOICE DISORDER: ICD-10-CM

## 2019-07-23 ENCOUNTER — TELEPHONE (OUTPATIENT)
Dept: MEDICAL GROUP | Facility: MEDICAL CENTER | Age: 29
End: 2019-07-23

## 2019-07-23 RX ORDER — NITROFURANTOIN 25; 75 MG/1; MG/1
100 CAPSULE ORAL 2 TIMES DAILY
Qty: 10 CAP | Refills: 0 | Status: SHIPPED | OUTPATIENT
Start: 2019-07-23 | End: 2019-08-20

## 2019-07-23 NOTE — TELEPHONE ENCOUNTER
Pt was notified of UA Labn results from [facility]. Per Dr. Mane positive for a UTI, Pt needs to be on antibiotics if not already on them.    Spoke with Pt. Pt confirms she went to [facility] on Sunday 7/14/19 and has completed a course of antibiotics. No residual symptoms.

## 2019-07-24 ENCOUNTER — OFFICE VISIT (OUTPATIENT)
Dept: MEDICAL GROUP | Facility: MEDICAL CENTER | Age: 29
End: 2019-07-24
Attending: FAMILY MEDICINE
Payer: MEDICAID

## 2019-07-24 VITALS
BODY MASS INDEX: 26.03 KG/M2 | SYSTOLIC BLOOD PRESSURE: 108 MMHG | OXYGEN SATURATION: 98 % | DIASTOLIC BLOOD PRESSURE: 70 MMHG | HEART RATE: 70 BPM | TEMPERATURE: 97.7 F | WEIGHT: 162 LBS | RESPIRATION RATE: 14 BRPM | HEIGHT: 66 IN

## 2019-07-24 DIAGNOSIS — Z86.39 HISTORY OF DIABETES MELLITUS: ICD-10-CM

## 2019-07-24 DIAGNOSIS — R73.9 HYPERGLYCEMIA: ICD-10-CM

## 2019-07-24 DIAGNOSIS — R10.2 PELVIC PAIN: ICD-10-CM

## 2019-07-24 LAB
HBA1C MFR BLD: 5.3 % (ref 0–5.6)
INT CON NEG: NEGATIVE
INT CON POS: POSITIVE

## 2019-07-24 PROCEDURE — 83036 HEMOGLOBIN GLYCOSYLATED A1C: CPT | Performed by: FAMILY MEDICINE

## 2019-07-24 PROCEDURE — 99213 OFFICE O/P EST LOW 20 MIN: CPT | Performed by: FAMILY MEDICINE

## 2019-07-24 PROCEDURE — 99214 OFFICE O/P EST MOD 30 MIN: CPT | Performed by: FAMILY MEDICINE

## 2019-07-24 ASSESSMENT — ENCOUNTER SYMPTOMS
DEPRESSION: 0
NERVOUS/ANXIOUS: 1
HALLUCINATIONS: 0
FEVER: 0
SHORTNESS OF BREATH: 0
TREMORS: 0
FOCAL WEAKNESS: 0
CHILLS: 0
SPUTUM PRODUCTION: 0
COUGH: 0
NAUSEA: 0
SENSORY CHANGE: 0
BACK PAIN: 1
PALPITATIONS: 0
VOMITING: 0
ABDOMINAL PAIN: 0
SPEECH CHANGE: 0
HEADACHES: 0
TINGLING: 1

## 2019-07-24 ASSESSMENT — LIFESTYLE VARIABLES: SUBSTANCE_ABUSE: 0

## 2019-07-24 NOTE — PROGRESS NOTES
Subjective:      Francois Quintero is a 29 y.o. female who presents with Results            Patient 29-year-old female here for follow-up of her recurrent pelvic pain, episodes of hyperglycemia as well as a history in her family history of diabetes.    Patient states that she has been having recurrent bouts of pelvic pain on her right side over a period of several months.  Patient states that she first started noticing the pain about the time she had been pregnant with her son.  She was told at that time that it may be an ovarian cyst or a fatty tumor.  She was told that the pain would resolve after her pregnancy ended.  She states that the pain has not stopped, but waxes and wanes with her periods.  She states that during the times of her periods she notices the pain intensifies and as her menstrual cycle comes to the end the pain starts to wane.  Will order pelvic ultrasound to further evaluate.  If it does appear to be something other than an ovarian cyst will refer back to GYN for a further evaluation.  We will continue to follow.    She has a family history of diabetes, and also noted that on a few previous blood test she had blood sugars that were higher than they should be.  Since she is having a lot of unusual symptoms she is concerned about being diabetic as well.  Today we will do a point-of-care hemoglobin A1c to see her numbers elevated.  Also discussed managing her diet avoiding fatty and fried foods as well as eating foods that are lower on the glycemic index scale.  Also recommended exercising 4-5 times weekly for 20 to 30 minutes at a time.     Current medications, allergies, and problem list reviewed with patient and updated in EPIC.          Review of Systems   Constitutional: Negative for chills and fever.   HENT: Negative for hearing loss and tinnitus.    Respiratory: Negative for cough, sputum production and shortness of breath.    Cardiovascular: Negative for chest pain and palpitations.  "  Gastrointestinal: Negative for abdominal pain, nausea and vomiting.   Genitourinary:        Right-sided pelvic pain   Musculoskeletal: Positive for back pain and joint pain.   Skin: Negative for rash.   Neurological: Positive for tingling. Negative for tremors, sensory change, speech change, focal weakness and headaches.   Psychiatric/Behavioral: Negative for depression, hallucinations, substance abuse and suicidal ideas. The patient is nervous/anxious.           Objective:     /70 (BP Location: Left arm, Patient Position: Sitting, BP Cuff Size: Adult)   Pulse 70   Temp 36.5 °C (97.7 °F) (Temporal)   Resp 14   Ht 1.676 m (5' 6\")   Wt 73.5 kg (162 lb)   SpO2 98%   BMI 26.15 kg/m²      Physical Exam   Constitutional: She is oriented to person, place, and time.   BMI 26.15   HENT:   Head: Normocephalic and atraumatic.   Nose: Nose normal.   Mouth/Throat: Oropharynx is clear and moist.   Eyes: Pupils are equal, round, and reactive to light. Conjunctivae and EOM are normal.   Neck: Normal range of motion. Neck supple. No thyromegaly present.   Cardiovascular: Normal rate, regular rhythm and normal heart sounds.  Exam reveals no friction rub.    No murmur heard.  Pulmonary/Chest: Effort normal and breath sounds normal. No respiratory distress. She has no wheezes. She has no rales.   Abdominal: Soft. Bowel sounds are normal. She exhibits no distension. There is no tenderness.   Lymphadenopathy:     She has no cervical adenopathy.   Neurological: She is alert and oriented to person, place, and time.   Skin: Skin is warm and dry.   Psychiatric: She has a normal mood and affect. Her behavior is normal.   Nursing note and vitals reviewed.              Assessment/Plan:     1. Pelvic pain  A transvaginal ultrasound has been ordered for her pelvic pain.  We will have patient get it done if her pelvic continues to recur.  Also discussed referring back to GYN if necessary.  We will continue to follow.  - US-PELVIC " TRANSVAGINAL ONLY; Future    2. Hyperglycemia  Since she was concerned about the potential for developing diabetes, a hemoglobin A1c was done today to further evaluate.  Her hemoglobin A1c today was at 5.3, so patient reassured that she is not diabetic at this point.  Discussed diet and exercise.  We will continue to follow.  - POCT  A1C    3. Family History of diabetes mellitus  See above plan.  - POCT  A1C

## 2019-08-16 ENCOUNTER — HOSPITAL ENCOUNTER (OUTPATIENT)
Dept: RADIOLOGY | Facility: MEDICAL CENTER | Age: 29
End: 2019-08-16
Attending: FAMILY MEDICINE
Payer: MEDICAID

## 2019-08-16 DIAGNOSIS — R10.2 PELVIC PAIN: ICD-10-CM

## 2019-08-16 PROCEDURE — 76830 TRANSVAGINAL US NON-OB: CPT

## 2019-08-20 ENCOUNTER — OFFICE VISIT (OUTPATIENT)
Dept: MEDICAL GROUP | Facility: MEDICAL CENTER | Age: 29
End: 2019-08-20
Attending: FAMILY MEDICINE
Payer: MEDICAID

## 2019-08-20 VITALS
RESPIRATION RATE: 16 BRPM | HEART RATE: 80 BPM | DIASTOLIC BLOOD PRESSURE: 60 MMHG | OXYGEN SATURATION: 96 % | SYSTOLIC BLOOD PRESSURE: 100 MMHG | TEMPERATURE: 98.2 F

## 2019-08-20 DIAGNOSIS — R10.31 RIGHT LOWER QUADRANT ABDOMINAL PAIN: ICD-10-CM

## 2019-08-20 PROCEDURE — 99213 OFFICE O/P EST LOW 20 MIN: CPT | Performed by: FAMILY MEDICINE

## 2019-08-20 PROCEDURE — 99214 OFFICE O/P EST MOD 30 MIN: CPT | Performed by: FAMILY MEDICINE

## 2019-08-20 RX ORDER — TRAMADOL HYDROCHLORIDE 50 MG/1
50 TABLET ORAL EVERY 6 HOURS PRN
Qty: 21 TAB | Refills: 0 | Status: SHIPPED | OUTPATIENT
Start: 2019-08-20 | End: 2019-09-05 | Stop reason: SDUPTHER

## 2019-08-20 ASSESSMENT — ENCOUNTER SYMPTOMS
NAUSEA: 0
CHILLS: 1
ANOREXIA: 0
SHORTNESS OF BREATH: 0
CONSTIPATION: 0
HEMATOCHEZIA: 0
TINGLING: 0
BLOOD IN STOOL: 0
BELCHING: 0
SPUTUM PRODUCTION: 0
WEIGHT LOSS: 0
VOMITING: 0
ABDOMINAL PAIN: 1
MYALGIAS: 0
FEVER: 0
FLATUS: 0
COUGH: 0
DIARRHEA: 0
PALPITATIONS: 0
HEADACHES: 0
ARTHRALGIAS: 0
PSYCHIATRIC NEGATIVE: 1

## 2019-08-20 NOTE — PROGRESS NOTES
Subjective:      Francois Quintero is a 29 y.o. female who presents with Results (US)            RLQ Pain   This is a recurrent problem. The current episode started more than 1 year ago (since the birth of her baby). The problem occurs daily. The problem has been gradually worsening. The pain is located in the RLQ. The pain is at a severity of 6/10. The pain is moderate. The quality of the pain is dull, aching and cramping. The abdominal pain does not radiate. Pertinent negatives include no anorexia, arthralgias, belching, constipation, diarrhea, dysuria, fever, flatus, frequency, headaches, hematochezia, hematuria, melena, myalgias, nausea, vomiting or weight loss. The pain is aggravated by certain positions and palpation. The pain is relieved by nothing. Treatments tried: will refer to surgery for a further evaluation of her abdominal pain, ER precautions given to pt. Prior diagnostic workup includes ultrasound (will order an MRI, pt declined CT due to radiation). history of pelvic pain       Review of Systems   Constitutional: Positive for chills. Negative for fever and weight loss.   HENT: Negative for hearing loss and tinnitus.    Respiratory: Negative for cough, sputum production and shortness of breath.    Cardiovascular: Negative for chest pain and palpitations.   Gastrointestinal: Positive for abdominal pain. Negative for anorexia, blood in stool, constipation, diarrhea, flatus, hematochezia, melena, nausea and vomiting.   Genitourinary: Negative for dysuria, frequency and hematuria.   Musculoskeletal: Negative for arthralgias and myalgias.   Neurological: Negative for tingling and headaches.   Psychiatric/Behavioral: Negative.           Objective:     /60 (BP Location: Left arm, Patient Position: Sitting, BP Cuff Size: Adult)   Pulse 80   Temp 36.8 °C (98.2 °F) (Temporal)   Resp 16   SpO2 96%      Physical Exam   Constitutional: She is oriented to person, place, and time. She appears  well-developed and well-nourished.   HENT:   Head: Normocephalic and atraumatic.   Cardiovascular: Normal rate, regular rhythm and normal heart sounds. Exam reveals no friction rub.   No murmur heard.  Pulmonary/Chest: Effort normal and breath sounds normal. No stridor. No respiratory distress. She has no wheezes.   Abdominal: Soft. Bowel sounds are normal. She exhibits no distension. There is tenderness.   Right lower quadrant adjacent to umbilicus, no rebound tenderness   Musculoskeletal: Normal range of motion.   Neurological: She is alert and oriented to person, place, and time.   Skin: Skin is warm and dry.   Psychiatric: She has a normal mood and affect. Her behavior is normal.   Nursing note and vitals reviewed.              Assessment/Plan:     1. Right lower quadrant abdominal pain  An MRI of her abdomen has been ordered, since patient declined an abdominal CT scan secondary to not wanting to be further exposed to x-ray radiation.  A referral to surgery has been made for her today for a further evaluation of her right lower quadrant pain that has been persistent and worsening.  ER precautions have been given to patient.  - [id number]; Future  - REFERRAL TO GENERAL SURGERY  - Controlled Substance Treatment Agreement  - tramadol (ULTRAM) 50 MG Tab; Take 1 Tab by mouth every 6 hours as needed for up to 7 days.  Dispense: 21 Tab; Refill: 0

## 2019-08-24 ENCOUNTER — APPOINTMENT (OUTPATIENT)
Dept: RADIOLOGY | Facility: MEDICAL CENTER | Age: 29
End: 2019-08-24
Attending: EMERGENCY MEDICINE
Payer: MEDICAID

## 2019-08-24 ENCOUNTER — HOSPITAL ENCOUNTER (EMERGENCY)
Facility: MEDICAL CENTER | Age: 29
End: 2019-08-24
Attending: EMERGENCY MEDICINE
Payer: MEDICAID

## 2019-08-24 VITALS
SYSTOLIC BLOOD PRESSURE: 97 MMHG | HEIGHT: 66 IN | BODY MASS INDEX: 26.57 KG/M2 | DIASTOLIC BLOOD PRESSURE: 58 MMHG | HEART RATE: 71 BPM | TEMPERATURE: 98.1 F | WEIGHT: 165.34 LBS | RESPIRATION RATE: 18 BRPM | OXYGEN SATURATION: 97 %

## 2019-08-24 DIAGNOSIS — R10.31 RIGHT LOWER QUADRANT ABDOMINAL PAIN: ICD-10-CM

## 2019-08-24 DIAGNOSIS — G89.29 OTHER CHRONIC PAIN: ICD-10-CM

## 2019-08-24 LAB
ALBUMIN SERPL BCP-MCNC: 4.5 G/DL (ref 3.2–4.9)
ALBUMIN/GLOB SERPL: 1.6 G/DL
ALP SERPL-CCNC: 41 U/L (ref 30–99)
ALT SERPL-CCNC: 15 U/L (ref 2–50)
ANION GAP SERPL CALC-SCNC: 11 MMOL/L (ref 0–11.9)
APPEARANCE UR: CLEAR
AST SERPL-CCNC: 17 U/L (ref 12–45)
BASOPHILS # BLD AUTO: 0.9 % (ref 0–1.8)
BASOPHILS # BLD: 0.06 K/UL (ref 0–0.12)
BILIRUB SERPL-MCNC: 0.4 MG/DL (ref 0.1–1.5)
BILIRUB UR QL STRIP.AUTO: NEGATIVE
BUN SERPL-MCNC: 13 MG/DL (ref 8–22)
CALCIUM SERPL-MCNC: 9.3 MG/DL (ref 8.4–10.2)
CHLORIDE SERPL-SCNC: 103 MMOL/L (ref 96–112)
CO2 SERPL-SCNC: 26 MMOL/L (ref 20–33)
COLOR UR: YELLOW
CREAT SERPL-MCNC: 0.96 MG/DL (ref 0.5–1.4)
EOSINOPHIL # BLD AUTO: 0.39 K/UL (ref 0–0.51)
EOSINOPHIL NFR BLD: 5.9 % (ref 0–6.9)
ERYTHROCYTE [DISTWIDTH] IN BLOOD BY AUTOMATED COUNT: 32.7 FL (ref 35.9–50)
GLOBULIN SER CALC-MCNC: 2.9 G/DL (ref 1.9–3.5)
GLUCOSE SERPL-MCNC: 100 MG/DL (ref 65–99)
GLUCOSE UR STRIP.AUTO-MCNC: NEGATIVE MG/DL
HCG SERPL QL: NEGATIVE
HCT VFR BLD AUTO: 40.4 % (ref 37–47)
HGB BLD-MCNC: 12.6 G/DL (ref 12–16)
IMM GRANULOCYTES # BLD AUTO: 0.01 K/UL (ref 0–0.11)
IMM GRANULOCYTES NFR BLD AUTO: 0.2 % (ref 0–0.9)
KETONES UR STRIP.AUTO-MCNC: NEGATIVE MG/DL
LEUKOCYTE ESTERASE UR QL STRIP.AUTO: NEGATIVE
LIPASE SERPL-CCNC: 26 U/L (ref 7–58)
LYMPHOCYTES # BLD AUTO: 2.74 K/UL (ref 1–4.8)
LYMPHOCYTES NFR BLD: 41.4 % (ref 22–41)
MCH RBC QN AUTO: 20.6 PG (ref 27–33)
MCHC RBC AUTO-ENTMCNC: 31.2 G/DL (ref 33.6–35)
MCV RBC AUTO: 65.9 FL (ref 81.4–97.8)
MICRO URNS: NORMAL
MONOCYTES # BLD AUTO: 0.46 K/UL (ref 0–0.85)
MONOCYTES NFR BLD AUTO: 6.9 % (ref 0–13.4)
NEUTROPHILS # BLD AUTO: 2.96 K/UL (ref 2–7.15)
NEUTROPHILS NFR BLD: 44.7 % (ref 44–72)
NITRITE UR QL STRIP.AUTO: NEGATIVE
NRBC # BLD AUTO: 0 K/UL
NRBC BLD-RTO: 0 /100 WBC
PH UR STRIP.AUTO: 6 [PH] (ref 5–8)
PLATELET # BLD AUTO: 300 K/UL (ref 164–446)
PMV BLD AUTO: 10.8 FL (ref 9–12.9)
POTASSIUM SERPL-SCNC: 3.9 MMOL/L (ref 3.6–5.5)
PROT SERPL-MCNC: 7.4 G/DL (ref 6–8.2)
PROT UR QL STRIP: NEGATIVE MG/DL
RBC # BLD AUTO: 6.13 M/UL (ref 4.2–5.4)
RBC UR QL AUTO: NEGATIVE
SODIUM SERPL-SCNC: 140 MMOL/L (ref 135–145)
SP GR UR STRIP.AUTO: 1.02
WBC # BLD AUTO: 6.6 K/UL (ref 4.8–10.8)

## 2019-08-24 PROCEDURE — 80053 COMPREHEN METABOLIC PANEL: CPT

## 2019-08-24 PROCEDURE — 83690 ASSAY OF LIPASE: CPT

## 2019-08-24 PROCEDURE — 84703 CHORIONIC GONADOTROPIN ASSAY: CPT

## 2019-08-24 PROCEDURE — 85025 COMPLETE CBC W/AUTO DIFF WBC: CPT

## 2019-08-24 PROCEDURE — 36415 COLL VENOUS BLD VENIPUNCTURE: CPT

## 2019-08-24 PROCEDURE — 76705 ECHO EXAM OF ABDOMEN: CPT

## 2019-08-24 PROCEDURE — 99284 EMERGENCY DEPT VISIT MOD MDM: CPT

## 2019-08-24 PROCEDURE — 81003 URINALYSIS AUTO W/O SCOPE: CPT

## 2019-08-24 NOTE — ED TRIAGE NOTES
"Presents complaining of right abdominal pain recurring for approximately a year.  Appendicitis, ovarian cysts, and cholecystitis have been R/O.  She denies fever, or any episodes of N/V.  Chief Complaint   Patient presents with   • Abdominal Pain     /70   Pulse 70   Temp 36.7 °C (98.1 °F) (Temporal)   Resp 18   Ht 1.676 m (5' 6\")   Wt 75 kg (165 lb 5.5 oz)   LMP 08/17/2019 (Approximate)   SpO2 99%   BMI 26.69 kg/m²           "

## 2019-08-25 NOTE — ED PROVIDER NOTES
"ED Provider Note    CHIEF COMPLAINT  Chief Complaint   Patient presents with   • Abdominal Pain       HPI  Francois Quintero is a 29 y.o. female who presents with abdominal pain.  She developed it a few months prior to having her baby who is now 10 months old, giving her a length of abdominal pain over a year and a half.  She describes having had a work-up to include seeing gynecology, GI, and her primary doctor none of whom been able to help her.  She was here for belly pain in January had a CT scan at that time which was unrevealing.  She just recently had a sonogram of her pelvis which showed a small uterine fibroid, otherwise unremarkable.  She describes a continuous pain, having it most days of the month.  There is no clear alleviating or exacerbating factors.  It seems to be in the right middle side of her abdomen.  There is no change with eating or drinking, although she does note that she has had some acid sensation at times.  There is been no change in bowel or bladder.  She does not think that she is constipated.  There is been no vaginal symptoms at all, her cycles are normal.  It sounds as if her doctor thinks it could be \"chronic appendicitis\" and he has ordered an MRI to address this.  It is not yet been done.  The patient is here because of the delay in obtaining the MR.  There is note that she recently had a work-up from neuro for numbness sensation which led to a negative MRI of the brain.  Those symptoms have since gotten better.  There is no other complaint.    PAST MEDICAL HISTORY  Past Medical History:   Diagnosis Date   • Asthma        FAMILY HISTORY  Family History   Problem Relation Age of Onset   • Cancer Mother         skin,ovarian   • Cancer Paternal Uncle    • Cancer Maternal Grandmother         breast       SOCIAL HISTORY  Social History     Tobacco Use   • Smoking status: Never Smoker   • Smokeless tobacco: Never Used   Substance Use Topics   • Alcohol use: No   • Drug use: No " "        SURGICAL HISTORY  History reviewed. No pertinent surgical history.    CURRENT MEDICATIONS    I have reviewed the nurses notes and/or the list brought with the patient.    ALLERGIES  No Known Allergies    REVIEW OF SYSTEMS  See HPI for further details. Review of systems as above, otherwise all other systems are negative.     PHYSICAL EXAM  VITAL SIGNS: BP (!) 97/58   Pulse 71   Temp 36.7 °C (98.1 °F) (Temporal)   Resp 18   Ht 1.676 m (5' 6\")   Wt 75 kg (165 lb 5.5 oz)   LMP 08/17/2019 (Approximate)   SpO2 97%   BMI 26.69 kg/m²     Constitutional: Well appearing patient in no acute distress.  Not toxic, nor ill in appearance.  HENT: Mucus membranes moist.  Oropharynx is clear.  Eyes: Pupils equally round.  No scleral icterus.   Neck: Full nontender range of motion.  Lymphatic: No cervical lymphadenopathy noted.   Cardiovascular: Regular heart rate and rhythm.  No murmurs, rubs, nor gallop appreciated.   Thorax & Lungs: Chest is nontender.  Lungs are clear to auscultation with good air movement bilaterally.  No wheeze, rhonchi, nor rales.   Abdomen: Soft, with mild diffuse tenderness in the right side of her abdomen however no rebound nor guarding.  No mass, pulsatile mass, nor hepatosplenomegaly appreciated.  No Peña sign.  No tenderness over McBurney's.  Pelvic exam was deferred.  No rash.  No CVA tenderness.  Skin: No purpura nor petechia noted.  Extremities/Musculoskeletal: No sign of trauma.  Calves are nontender with no cords nor edema.  No Tucker's sign.  Pulses are intact all around.   Neurologic: Alert & oriented.  Strength and sensation is intact all around.  Gait is normal.  Psychiatric: Normal affect appropriate for the clinical situation.      LABS  Labs Reviewed   CBC WITH DIFFERENTIAL - Abnormal; Notable for the following components:       Result Value    RBC 6.13 (*)     MCV 65.9 (*)     MCH 20.6 (*)     MCHC 31.2 (*)     RDW 32.7 (*)     Lymphocytes 41.40 (*)     All other components " within normal limits   COMP METABOLIC PANEL - Abnormal; Notable for the following components:    Glucose 100 (*)     All other components within normal limits   LIPASE   URINALYSIS,CULTURE IF INDICATED   HCG QUAL SERUM   ESTIMATED GFR         RADIOLOGY/PROCEDURES  I have reviewed the patient's film interpretations myself, and they are read out by the radiologist as:   US-RUQ   Final Result      No evidence of gallstones or biliary ductal dilatation.      Tiny hepatic cyst.      Possible hepatic steatosis.      Minimal right pelviectasis.              .    MEDICAL RECORD  I have reviewed patient's medical record and pertinent results are listed above.    COURSE & MEDICAL DECISION MAKING  I have reviewed any medical record information, laboratory studies and radiographic results as noted above.  Patient presents with abdominal pain for over a year and a half.  We discussed possible constipation she does not think it is a possibility.  She had a CT scan for the same symptoms 7 months ago which was unrevealing.  She has a benign abdomen today.  There is no leukocytosis.  No shift.  No hepatitis or pancreatitis.  She is not pregnant.  There is no urinary tract infection, no blood in the urine.  She has had no vaginal symptoms.  She has had a negative sonogram with exception of a small fibroid of her pelvis.  I did obtain a ultrasound of her gallbladder which is unrevealing.  She is previously seen gynecology as well as GI who do not have an answer for her.  She is quite concerned about appendicitis.  As I discussed with her, her symptoms are not at all consistent with appendicitis or any other surgical process.  we did talk at length about chronic pain, and I have recommended she follow closely with Dr. Mane.  She may discuss treatment modalities with him.  I did also recommend she get input from gynecology once again as well as GI.  Instructions on abdominal pain as well as chronic pain.      FINAL IMPRESSION  1. Right  lower quadrant abdominal pain    2. Other chronic pain           This dictation was created using voice recognition software.    Electronically signed by: Diego Anand, 8/24/2019 6:08 PM

## 2019-08-25 NOTE — DISCHARGE INSTRUCTIONS
Please continue follow-up with Dr. Mane.  It may certainly be worth talking about treatment for chronic pain management.  However, I do recommend also rechecking with GI as well as gynecology for their input.

## 2019-08-27 ENCOUNTER — TELEPHONE (OUTPATIENT)
Dept: MEDICAL GROUP | Facility: MEDICAL CENTER | Age: 29
End: 2019-08-27

## 2019-08-27 DIAGNOSIS — R10.2 PELVIC PAIN: ICD-10-CM

## 2019-08-27 NOTE — TELEPHONE ENCOUNTER
Pat from [facility] called from ext: [phone]    Regarding patients MRI order, she seems confused due to lower abdomen without contrast when it notes its lower abdomen in pelvis area possible fibroids. They would like clarification on the order. Pt scheduled in September for MRI

## 2019-08-28 NOTE — TELEPHONE ENCOUNTER
She may need to schedule an appt with gyn for her fibroids. Her MRI order was changed to pelvis, but not sure if she needs test done. Please have her schedule to discuss in more detail.   Thanks

## 2019-09-03 ENCOUNTER — HOSPITAL ENCOUNTER (OUTPATIENT)
Dept: RADIOLOGY | Facility: MEDICAL CENTER | Age: 29
End: 2019-09-03
Attending: FAMILY MEDICINE
Payer: MEDICAID

## 2019-09-03 DIAGNOSIS — R10.31 RIGHT LOWER QUADRANT ABDOMINAL PAIN: ICD-10-CM

## 2019-09-03 PROCEDURE — 74181 MRI ABDOMEN W/O CONTRAST: CPT

## 2019-09-05 DIAGNOSIS — R10.31 RIGHT LOWER QUADRANT ABDOMINAL PAIN: ICD-10-CM

## 2019-09-05 RX ORDER — TRAMADOL HYDROCHLORIDE 50 MG/1
50 TABLET ORAL EVERY 6 HOURS PRN
Qty: 21 TAB | Refills: 0 | Status: SHIPPED | OUTPATIENT
Start: 2019-09-05 | End: 2019-09-12

## 2019-09-13 ENCOUNTER — APPOINTMENT (OUTPATIENT)
Dept: RADIOLOGY | Facility: MEDICAL CENTER | Age: 29
End: 2019-09-13
Attending: FAMILY MEDICINE
Payer: MEDICAID

## 2019-09-13 ENCOUNTER — HOSPITAL ENCOUNTER (OUTPATIENT)
Dept: RADIOLOGY | Facility: MEDICAL CENTER | Age: 29
End: 2019-09-13
Attending: SURGERY
Payer: MEDICAID

## 2019-09-13 DIAGNOSIS — I63.00 CEREBRAL INFARCTION DUE TO THROMBOSIS OF PRECEREBRAL ARTERY (HCC): ICD-10-CM

## 2019-09-13 PROCEDURE — A9537 TC99M MEBROFENIN: HCPCS

## 2019-09-20 ENCOUNTER — APPOINTMENT (OUTPATIENT)
Dept: RADIOLOGY | Facility: MEDICAL CENTER | Age: 29
End: 2019-09-20
Attending: FAMILY MEDICINE
Payer: MEDICAID

## 2020-01-13 ENCOUNTER — HOSPITAL ENCOUNTER (OUTPATIENT)
Dept: RADIOLOGY | Facility: MEDICAL CENTER | Age: 30
End: 2020-01-13
Attending: OTOLARYNGOLOGY
Payer: MEDICAID

## 2020-01-13 DIAGNOSIS — R49.8 NEUROLOGIC VOICE DISORDER: ICD-10-CM

## 2020-01-13 PROCEDURE — 70491 CT SOFT TISSUE NECK W/DYE: CPT

## 2020-01-13 PROCEDURE — 700117 HCHG RX CONTRAST REV CODE 255

## 2020-01-13 RX ADMIN — IOHEXOL 80 ML: 350 INJECTION, SOLUTION INTRAVENOUS at 09:03

## 2020-03-03 ENCOUNTER — HOSPITAL ENCOUNTER (OUTPATIENT)
Dept: RADIOLOGY | Facility: MEDICAL CENTER | Age: 30
End: 2020-03-03
Attending: FAMILY MEDICINE
Payer: MEDICAID

## 2020-03-03 DIAGNOSIS — R10.9 ABDOMINAL PAIN, UNSPECIFIED ABDOMINAL LOCATION: ICD-10-CM

## 2020-03-03 DIAGNOSIS — N63.0 LUMP OR MASS IN BREAST: ICD-10-CM

## 2020-03-03 PROCEDURE — 76705 ECHO EXAM OF ABDOMEN: CPT

## 2020-03-03 PROCEDURE — G0279 TOMOSYNTHESIS, MAMMO: HCPCS

## 2020-03-03 PROCEDURE — 76642 ULTRASOUND BREAST LIMITED: CPT | Mod: RT

## 2020-08-27 ENCOUNTER — HOSPITAL ENCOUNTER (OUTPATIENT)
Dept: RADIOLOGY | Facility: MEDICAL CENTER | Age: 30
End: 2020-08-27
Attending: STUDENT IN AN ORGANIZED HEALTH CARE EDUCATION/TRAINING PROGRAM
Payer: MEDICAID

## 2020-08-27 ENCOUNTER — HOSPITAL ENCOUNTER (OUTPATIENT)
Dept: RADIOLOGY | Facility: MEDICAL CENTER | Age: 30
End: 2020-08-27
Attending: FAMILY MEDICINE
Payer: MEDICAID

## 2020-08-27 DIAGNOSIS — N63.0 BREAST MASS: ICD-10-CM

## 2020-08-27 DIAGNOSIS — R10.9 FLANK PAIN: ICD-10-CM

## 2020-08-27 PROCEDURE — 76642 ULTRASOUND BREAST LIMITED: CPT | Mod: LT

## 2020-08-27 PROCEDURE — 76705 ECHO EXAM OF ABDOMEN: CPT

## 2021-02-08 ENCOUNTER — OFFICE VISIT (OUTPATIENT)
Dept: CARDIOLOGY | Facility: MEDICAL CENTER | Age: 31
End: 2021-02-08
Payer: COMMERCIAL

## 2021-02-08 VITALS
RESPIRATION RATE: 12 BRPM | HEART RATE: 78 BPM | SYSTOLIC BLOOD PRESSURE: 112 MMHG | WEIGHT: 200 LBS | HEIGHT: 66 IN | OXYGEN SATURATION: 96 % | BODY MASS INDEX: 32.14 KG/M2 | DIASTOLIC BLOOD PRESSURE: 72 MMHG

## 2021-02-08 DIAGNOSIS — R00.2 PALPITATIONS: ICD-10-CM

## 2021-02-08 LAB — EKG IMPRESSION: NORMAL

## 2021-02-08 PROCEDURE — 99214 OFFICE O/P EST MOD 30 MIN: CPT | Performed by: INTERNAL MEDICINE

## 2021-02-08 PROCEDURE — 93000 ELECTROCARDIOGRAM COMPLETE: CPT | Performed by: INTERNAL MEDICINE

## 2021-02-08 RX ORDER — METOPROLOL SUCCINATE 25 MG/1
25 TABLET, EXTENDED RELEASE ORAL DAILY
Qty: 90 TAB | Refills: 3 | Status: SHIPPED | OUTPATIENT
Start: 2021-02-08 | End: 2022-08-18

## 2021-02-08 RX ORDER — NITROFURANTOIN 25; 75 MG/1; MG/1
100 CAPSULE ORAL
COMMUNITY
Start: 2021-02-03 | End: 2022-08-18

## 2021-02-08 ASSESSMENT — ENCOUNTER SYMPTOMS
COUGH: 0
WEIGHT GAIN: 0
CLAUDICATION: 0
DIARRHEA: 0
BACK PAIN: 0
FLANK PAIN: 0
CONSTIPATION: 0
SHORTNESS OF BREATH: 0
HEARTBURN: 0
PALPITATIONS: 1
DEPRESSION: 0
ORTHOPNEA: 0
PND: 0
FEVER: 0
ALTERED MENTAL STATUS: 0
WEIGHT LOSS: 0
DECREASED APPETITE: 0
NAUSEA: 0
BLURRED VISION: 0
DIZZINESS: 0
NEAR-SYNCOPE: 0
DYSPNEA ON EXERTION: 0
SYNCOPE: 0
IRREGULAR HEARTBEAT: 0
VOMITING: 0
ABDOMINAL PAIN: 0

## 2021-02-08 ASSESSMENT — FIBROSIS 4 INDEX: FIB4 SCORE: 0.42

## 2021-02-08 NOTE — PATIENT INSTRUCTIONS
Orgenesisdia device to record palpitations.    Metoprolol extended-release tablets  What is this medicine?  METOPROLOL (me TOE proe lole) is a beta-blocker. Beta-blockers reduce the workload on the heart and help it to beat more regularly. This medicine is used to treat high blood pressure and to prevent chest pain. It is also used to after a heart attack and to prevent an additional heart attack from occurring.  This medicine may be used for other purposes; ask your health care provider or pharmacist if you have questions.  COMMON BRAND NAME(S): toprol, Toprol XL  What should I tell my health care provider before I take this medicine?  They need to know if you have any of these conditions:  · diabetes  · heart or vessel disease like slow heart rate, worsening heart failure, heart block, sick sinus syndrome or Raynaud's disease  · kidney disease  · liver disease  · lung or breathing disease, like asthma or emphysema  · pheochromocytoma  · thyroid disease  · an unusual or allergic reaction to metoprolol, other beta-blockers, medicines, foods, dyes, or preservatives  · pregnant or trying to get pregnant  · breast-feeding  How should I use this medicine?  Take this medicine by mouth with a glass of water. Follow the directions on the prescription label. Do not crush or chew. Take this medicine with or immediately after meals. Take your doses at regular intervals. Do not take more medicine than directed. Do not stop taking this medicine suddenly. This could lead to serious heart-related effects.  Talk to your pediatrician regarding the use of this medicine in children. While this drug may be prescribed for children as young as 6 years for selected conditions, precautions do apply.  Overdosage: If you think you have taken too much of this medicine contact a poison control center or emergency room at once.  NOTE: This medicine is only for you. Do not share this medicine with others.  What if I miss a dose?  If you miss a dose,  take it as soon as you can. If it is almost time for your next dose, take only that dose. Do not take double or extra doses.  What may interact with this medicine?  This medicine may interact with the following medications:  · certain medicines for blood pressure, heart disease, irregular heart beat  · certain medicines for depression, like monoamine oxidase (MAO) inhibitors, fluoxetine, or paroxetine  · clonidine  · dobutamine  · epinephrine  · isoproterenol  · reserpine  This list may not describe all possible interactions. Give your health care provider a list of all the medicines, herbs, non-prescription drugs, or dietary supplements you use. Also tell them if you smoke, drink alcohol, or use illegal drugs. Some items may interact with your medicine.  What should I watch for while using this medicine?  Visit your doctor or health care professional for regular check ups. Contact your doctor right away if your symptoms worsen. Check your blood pressure and pulse rate regularly. Ask your health care professional what your blood pressure and pulse rate should be, and when you should contact them.  You may get drowsy or dizzy. Do not drive, use machinery, or do anything that needs mental alertness until you know how this medicine affects you. Do not sit or stand up quickly, especially if you are an older patient. This reduces the risk of dizzy or fainting spells. Contact your doctor if these symptoms continue. Alcohol may interfere with the effect of this medicine. Avoid alcoholic drinks.  This medicine may increase blood sugar. Ask your healthcare provider if changes in diet or medicines are needed if you have diabetes.  What side effects may I notice from receiving this medicine?  Side effects that you should report to your doctor or health care professional as soon as possible:  · allergic reactions like skin rash, itching or hives  · cold or numb hands or feet  · depression  · difficulty  breathing  · faint  · fever with sore throat  · irregular heartbeat, chest pain  · rapid weight gain  ·   signs and symptoms of high blood sugar such as being more thirsty or hungry or having to urinate more than normal. You may also feel very tired or have blurry vision.  · swollen legs or ankles  Side effects that usually do not require medical attention (report to your doctor or health care professional if they continue or are bothersome):  · anxiety or nervousness  · change in sex drive or performance  · dry skin  · headache  · nightmares or trouble sleeping  · short term memory loss  · stomach upset or diarrhea  This list may not describe all possible side effects. Call your doctor for medical advice about side effects. You may report side effects to FDA at 3-903-OQS-4891.  Where should I keep my medicine?  Keep out of the reach of children.  Store at room temperature between 15 and 30 degrees C (59 and 86 degrees F). Throw away any unused medicine after the expiration date.  NOTE: This sheet is a summary. It may not cover all possible information. If you have questions about this medicine, talk to your doctor, pharmacist, or health care provider.  © 2020 Elsevier/Gold Standard (2019-10-08 11:09:41)      Food Choices for Gastroesophageal Reflux Disease, Adult  When you have gastroesophageal reflux disease (GERD), the foods you eat and your eating habits are very important. Choosing the right foods can help ease your discomfort. Think about working with a nutrition specialist (dietitian) to help you make good choices.  What are tips for following this plan?    Meals  · Choose healthy foods that are low in fat, such as fruits, vegetables, whole grains, low-fat dairy products, and lean meat, fish, and poultry.  · Eat small meals often instead of 3 large meals a day. Eat your meals slowly, and in a place where you are relaxed. Avoid bending over or lying down until 2-3 hours after eating.  · Avoid eating meals 2-3  hours before bed.  · Avoid drinking a lot of liquid with meals.  · Cook foods using methods other than frying. Bake, grill, or broil food instead.  · Avoid or limit:  ? Chocolate.  ? Peppermint or spearmint.  ? Alcohol.  ? Pepper.  ? Black and decaffeinated coffee.  ? Black and decaffeinated tea.  ? Bubbly (carbonated) soft drinks.  ? Caffeinated energy drinks and soft drinks.  · Limit high-fat foods such as:  ? Fatty meat or fried foods.  ? Whole milk, cream, butter, or ice cream.  ? Nuts and nut butters.  ? Pastries, donuts, and sweets made with butter or shortening.  · Avoid foods that cause symptoms. These foods may be different for everyone. Common foods that cause symptoms include:  ? Tomatoes.  ? Oranges, tam, and limes.  ? Peppers.  ? Spicy food.  ? Onions and garlic.  ? Vinegar.  Lifestyle  · Maintain a healthy weight. Ask your doctor what weight is healthy for you. If you need to lose weight, work with your doctor to do so safely.  · Exercise for at least 30 minutes for 5 or more days each week, or as told by your doctor.  · Wear loose-fitting clothes.  · Do not smoke. If you need help quitting, ask your doctor.  · Sleep with the head of your bed higher than your feet. Use a wedge under the mattress or blocks under the bed frame to raise the head of the bed.  Summary  · When you have gastroesophageal reflux disease (GERD), food and lifestyle choices are very important in easing your symptoms.  · Eat small meals often instead of 3 large meals a day. Eat your meals slowly, and in a place where you are relaxed.  · Limit high-fat foods such as fatty meat or fried foods.  · Avoid bending over or lying down until 2-3 hours after eating.  · Avoid peppermint and spearmint, caffeine, alcohol, and chocolate.  This information is not intended to replace advice given to you by your health care provider. Make sure you discuss any questions you have with your health care provider.  Document Released: 06/18/2013  Document Revised: 04/09/2020 Document Reviewed: 01/23/2018  Elsevier Patient Education © 2020 Elsevier Inc.

## 2021-03-30 ENCOUNTER — NON-PROVIDER VISIT (OUTPATIENT)
Dept: CARDIOLOGY | Facility: MEDICAL CENTER | Age: 31
End: 2021-03-30
Payer: COMMERCIAL

## 2021-03-30 ENCOUNTER — TELEPHONE (OUTPATIENT)
Dept: CARDIOLOGY | Facility: MEDICAL CENTER | Age: 31
End: 2021-03-30

## 2021-03-30 DIAGNOSIS — R00.2 PALPITATIONS: ICD-10-CM

## 2021-03-30 DIAGNOSIS — I49.3 PVC (PREMATURE VENTRICULAR CONTRACTION): ICD-10-CM

## 2021-03-30 NOTE — TELEPHONE ENCOUNTER
Patient enrolled in the 14 day Zio XT Patch Holter monitoring Program per Ronald Delgado MD.  In-Clinic hookup.    >Currently pending EOS.

## 2021-04-29 PROCEDURE — 93248 EXT ECG>7D<15D REV&INTERPJ: CPT | Performed by: INTERNAL MEDICINE

## 2021-04-29 PROCEDURE — 93246 EXT ECG>7D<15D RECORDING: CPT | Performed by: INTERNAL MEDICINE

## 2021-05-17 NOTE — TELEPHONE ENCOUNTER
U/s of breast and referral to   Breast center placed.  [place]   Anesthesia Type: 1% lidocaine with epinephrine

## 2021-05-18 ENCOUNTER — TELEPHONE (OUTPATIENT)
Dept: CARDIOLOGY | Facility: MEDICAL CENTER | Age: 31
End: 2021-05-18

## 2021-05-18 DIAGNOSIS — R00.2 PALPITATIONS: ICD-10-CM

## 2021-05-18 NOTE — TELEPHONE ENCOUNTER
VR    Patient called to advise they need the results to their Zio. Pt can be reached at [phone].    Thank you,  Donna ARREOLA

## 2021-05-19 NOTE — TELEPHONE ENCOUNTER
Returned call. Discussed that monitor did not show any significant abnormal rhythms, but showed some PVCs and a run of SVT. Pt is still concerned because she is still experiencing the symptoms of palpitations almost every night. Discussed the Ambika that VR had recommended and encouraged her to get this to record the episodes. She will try to do this, but also wants to know if VR recommends any other testing. Informed we will update her next week when he is back in the office.

## 2021-05-24 NOTE — TELEPHONE ENCOUNTER
You  Ronald Delgado M.D. 5 days ago     Pt concerned that she's still experiencing palpitations. She will try to get a Kardia, but she wants to make sure you don't recommend any additional testing.   Thanks!      Ronald Delgado M.D.  You 10 hours ago (10:45 PM)     Yea could add an echocardiogram. She could try the metoprolol if still having symptoms. Thanks Erin!      Echo order placed. [facility] with pt at [phone] notifying of this and providing with image schedulers number.

## 2021-07-28 ENCOUNTER — HOSPITAL ENCOUNTER (OUTPATIENT)
Dept: LAB | Facility: MEDICAL CENTER | Age: 31
End: 2021-07-28
Attending: FAMILY MEDICINE
Payer: COMMERCIAL

## 2021-07-29 ENCOUNTER — HOSPITAL ENCOUNTER (OUTPATIENT)
Dept: LAB | Facility: MEDICAL CENTER | Age: 31
End: 2021-07-29
Attending: STUDENT IN AN ORGANIZED HEALTH CARE EDUCATION/TRAINING PROGRAM
Payer: COMMERCIAL

## 2021-07-29 LAB
ALBUMIN SERPL BCP-MCNC: 4.3 G/DL (ref 3.2–4.9)
ALBUMIN/GLOB SERPL: 1.4 G/DL
ALP SERPL-CCNC: 43 U/L (ref 30–99)
ALT SERPL-CCNC: 26 U/L (ref 2–50)
ANION GAP SERPL CALC-SCNC: 14 MMOL/L (ref 7–16)
AST SERPL-CCNC: 28 U/L (ref 12–45)
BILIRUB SERPL-MCNC: 0.6 MG/DL (ref 0.1–1.5)
BUN SERPL-MCNC: 13 MG/DL (ref 8–22)
CALCIUM SERPL-MCNC: 9 MG/DL (ref 8.5–10.5)
CHLORIDE SERPL-SCNC: 102 MMOL/L (ref 96–112)
CHOLEST SERPL-MCNC: 182 MG/DL (ref 100–199)
CO2 SERPL-SCNC: 22 MMOL/L (ref 20–33)
CREAT SERPL-MCNC: 1.15 MG/DL (ref 0.5–1.4)
FASTING STATUS PATIENT QL REPORTED: NORMAL
GLOBULIN SER CALC-MCNC: 3.1 G/DL (ref 1.9–3.5)
GLUCOSE SERPL-MCNC: 90 MG/DL (ref 65–99)
HDLC SERPL-MCNC: 43 MG/DL
LDLC SERPL CALC-MCNC: 124 MG/DL
POTASSIUM SERPL-SCNC: 4.1 MMOL/L (ref 3.6–5.5)
PROT SERPL-MCNC: 7.4 G/DL (ref 6–8.2)
SODIUM SERPL-SCNC: 138 MMOL/L (ref 135–145)
TRIGL SERPL-MCNC: 77 MG/DL (ref 0–149)
TSH SERPL DL<=0.005 MIU/L-ACNC: 2.35 UIU/ML (ref 0.38–5.33)

## 2021-07-29 PROCEDURE — 84443 ASSAY THYROID STIM HORMONE: CPT

## 2021-07-29 PROCEDURE — 36415 COLL VENOUS BLD VENIPUNCTURE: CPT

## 2021-07-29 PROCEDURE — 80053 COMPREHEN METABOLIC PANEL: CPT

## 2021-07-29 PROCEDURE — 80061 LIPID PANEL: CPT

## 2021-09-10 NOTE — PROGRESS NOTES
[facility] ENCOUNTER  HEMATOLOGY/ONCOLOGY PROGRESS NOTE      CHIEF COMPLAINT:  Breast Cancer (Malignant neoplasm of lower-outer quadrant of left breast of female, estrogen receptor negative )      HISTORY OF PRESENT ILLNESS:  Kellie Pleitez is a 58 year old female seen today for breast cancer.  She underwent diagnostic mammography on 06/21/2021 due to left breast pain and a palpable lump in the left breast. She noted some growth between the time of identification and her biopsy.  A nodular density in the area of the palpable lump in the 3 o'clock position posterior 3rd of left breast was identified with ultrasound confirming a suspicious lobulated hypoechoic lesion measuring 1.6 x 1.1 x 1.1 cm.  Margins were irregular.  No left axillary adenopathy was present.  In the right breast a 6 x 3 x 5 mm area suggesting an island of fat her fiber adenoma was identified.  Biopsy was recommended of both abnormalities.    She reports no progressive breast changes apart from biopsy associated change.  She notes no systemic symptoms of malignancy such as unexplained pains, headaches, dyspnea, cough, unintentional weight loss, neurologic symptoms.    She states that her breast mass is shrinking.    ONCOLOGIC HISTORY:  1. 2010: Lumpectomy, radiation and tamoxifen x 5 years for DCIS.  2.  06/21/2021:  Diagnostic mammography for a palpable left breast lump demonstrated a lesion measuring 1.6 x 1.1 x 1.1 cm.  Margins were irregular.  No left axillary adenopathy was present.  A 6 x 3 x 5 mm area was also seen in the right breast.    3.  06/25/2021: Left breast, 3:00, core biopsy:  -Invasive poorly differentiated ductal carcinoma.   Estrogen Receptor: NEGATIVE         Nitza Score 0 (0 + 0); 0% of tumor nuclei,  none       Progesterone Receptor: NEGATIVE         Nitza Score 0 (0 + 0); 0% of tumor nuclei,  none           HER2 by IHC: Negative (0)  4.  06/25/2021:  Right breast, 11:00, core biopsy:  -Benign breast tissue  Cardiology Note    Chief Complaint   Patient presents with   • Palpitations       History of Present Illness: Francois Quintero is a 30 y.o. female who presents for palpitations.     Prior palpitations associated with pregnancy. Found PVCs. Resolved after pregnancy.     Occurred last week without recurrence. Also feels like esophageal tightenening. This was followed by palpitations and hypertension. No other associated symptoms. Called EMS. She was guided through abortive vagal maneuver which was successful. Unfortunately, aborted just prior to checking ECG and arrhythmia never captured. Around this event has received treatment for UTI. No systemic symptoms associated. Separate complaint of epigastric pain especially when not eating. Denies toxic social habits. No premature family history cardiac disease.     Review of Systems   Constitution: Negative for decreased appetite, fever, malaise/fatigue, weight gain and weight loss.   HENT: Negative for congestion and nosebleeds.    Eyes: Negative for blurred vision.   Cardiovascular: Positive for palpitations. Negative for chest pain, claudication, dyspnea on exertion, irregular heartbeat, leg swelling, near-syncope, orthopnea, paroxysmal nocturnal dyspnea and syncope.   Respiratory: Negative for cough and shortness of breath.    Endocrine: Negative for cold intolerance and heat intolerance.   Skin: Negative for rash.   Musculoskeletal: Negative for back pain.   Gastrointestinal: Negative for abdominal pain, constipation, diarrhea, heartburn, melena, nausea and vomiting.   Genitourinary: Negative for dysuria, flank pain and hematuria.   Neurological: Negative for dizziness.   Psychiatric/Behavioral: Negative for altered mental status and depression.         Past Medical History:   Diagnosis Date   • Asthma          History reviewed. No pertinent surgical history.      Current Outpatient Medications   Medication Sig Dispense Refill   • nitrofurantoin (MACROBID) 100  MG Cap Take 100 mg by mouth.     • metoprolol SR (TOPROL XL) 25 MG TABLET SR 24 HR Take 1 Tab by mouth every day. 90 Tab 3   • ibuprofen (MOTRIN) 200 MG Tab Take 200 mg by mouth every 6 hours as needed.     • Biotin 1 MG Cap Take  by mouth.       No current facility-administered medications for this visit.          No Known Allergies      Family History   Problem Relation Age of Onset   • Cancer Mother         skin,ovarian   • Cancer Paternal Uncle    • Cancer Maternal Grandmother         breast         Social History     Socioeconomic History   • Marital status: Single     Spouse name: Not on file   • Number of children: Not on file   • Years of education: Not on file   • Highest education level: Not on file   Occupational History   • Not on file   Social Needs   • Financial resource strain: Not on file   • Food insecurity     Worry: Not on file     Inability: Not on file   • Transportation needs     Medical: Not on file     Non-medical: Not on file   Tobacco Use   • Smoking status: Never Smoker   • Smokeless tobacco: Never Used   Substance and Sexual Activity   • Alcohol use: No   • Drug use: No   • Sexual activity: Not on file   Lifestyle   • Physical activity     Days per week: Not on file     Minutes per session: Not on file   • Stress: Not on file   Relationships   • Social connections     Talks on phone: Not on file     Gets together: Not on file     Attends Voodoo service: Not on file     Active member of club or organization: Not on file     Attends meetings of clubs or organizations: Not on file     Relationship status: Not on file   • Intimate partner violence     Fear of current or ex partner: Not on file     Emotionally abused: Not on file     Physically abused: Not on file     Forced sexual activity: Not on file   Other Topics Concern   • Not on file   Social History Narrative   • Not on file         Physical Exam:  Ambulatory Vitals  /72 (BP Location: Left arm, Patient Position: Sitting, BP  with fibroadenomatoid change.  -Negative for atypia or malignancy  5.  06/29/2021:  MRI of the breast confirmed a 1.7 cm biopsy-proven malignancy in the left 3 to 4 o'clock position with a loss of the fat plane between the mass in the pectoralis but with no definitive pectoralis enhancement.  Linear non-mass enhancement extends anterior to the mass for an AP diameter of 4.5 cm, consistent with additional malignancy.  No adenopathy seen or contralateral disease.  6.  07/16/2021:  Cycle 1 neoadjuvant dose dense Adriamycin and Cytoxan with weekly paclitaxel starting 09/10/2021    MEDICATIONS AND ALLERGIES:    Current Outpatient Medications   Medication Sig Dispense Refill   • OMEPRAZOLE PO      • SENNA PO      • diphenhydrAMINE HCl (BENADRYL ALLERGY PO)      • OLANZapine (ZyPREXA) 5 MG tablet Take 1 tablet by mouth nightly. Start the day of chemotherapy x 4 days for prevention of nausea/vomiting. 16 tablet 0   • cycloSPORINE (RESTASIS) 0.05 % ophthalmic emulsion Place 1 drop into both eyes 2 times daily.     • Melatonin 1 MG Tab Take by mouth nightly.      • fluticasone (FLONASE) 50 MCG/ACT nasal spray Spray 2 sprays in each nostril daily. 3 Bottle 0   • Glucosamine 500 MG CAPS Take 1,000 mg by mouth daily.     • MULTIVITAMINS PO TABS daily 0 0   • prochlorperazine (COMPAZINE) 10 MG tablet Take 1 tablet by mouth every 6 hours as needed for Nausea or Vomiting. 30 tablet 5   • dexamethasone (DECADRON) 4 MG tablet Take 2 tablets by mouth daily. Start the day after chemotherapy for 3 days. 24 tablet 0   • Probiotic Product (PROBIOTIC DAILY PO) Take by mouth daily.      • Multiple Vitamins-Minerals (OCUVITE ADULT FORMULA) Cap Take 1 capsule by mouth daily. 30 capsule      No current facility-administered medications for this visit.     ALLERGIES:   Allergen Reactions   • Penicillins RASH     rash       PROBLEM LIST:  Patient Active Problem List   Diagnosis   • CA IN SITU BREAST, UPPER OUTER LEFT BREAST   • Acquired absence  "Cuff Size: Adult)   Pulse 78   Resp 12   Ht 1.676 m (5' 6\")   Wt 90.7 kg (200 lb)   SpO2 96%    BP Readings from Last 4 Encounters:   02/08/21 112/72   08/24/19 (!) 97/58   08/20/19 100/60   07/24/19 108/70     Weight/BMI:   Vitals:    02/08/21 1056   BP: 112/72   Weight: 90.7 kg (200 lb)   Height: 1.676 m (5' 6\")    Body mass index is 32.28 kg/m².  Wt Readings from Last 4 Encounters:   02/08/21 90.7 kg (200 lb)   08/24/19 75 kg (165 lb 5.5 oz)   07/24/19 73.5 kg (162 lb)   06/06/19 72.6 kg (160 lb)       Physical Exam   Constitutional: She is oriented to person, place, and time and well-developed, well-nourished, and in no distress. No distress.   HENT:   Head: Normocephalic and atraumatic.   Eyes: Pupils are equal, round, and reactive to light. Conjunctivae are normal.   Neck: Normal range of motion. Neck supple. No JVD present.   Cardiovascular: Normal rate, regular rhythm, normal heart sounds and intact distal pulses. Exam reveals no gallop and no friction rub.   No murmur heard.  Pulmonary/Chest: Effort normal and breath sounds normal. No respiratory distress. She has no wheezes. She has no rales. She exhibits no tenderness.   Abdominal: Soft. Bowel sounds are normal. She exhibits no distension.   Musculoskeletal:         General: No edema.   Neurological: She is alert and oriented to person, place, and time.   Skin: Skin is warm and dry.   Psychiatric: Affect and judgment normal.       Lab Data Review:  No results found for: CHOLSTRLTOT, LDL, HDL, TRIGLYCERIDE    Lab Results   Component Value Date/Time    SODIUM 140 08/24/2019 03:30 PM    POTASSIUM 3.9 08/24/2019 03:30 PM    CHLORIDE 103 08/24/2019 03:30 PM    CO2 26 08/24/2019 03:30 PM    GLUCOSE 100 (H) 08/24/2019 03:30 PM    BUN 13 08/24/2019 03:30 PM    CREATININE 0.96 08/24/2019 03:30 PM     CrCl cannot be calculated (Patient's most recent lab result is older than the maximum 7 days allowed.).  Lab Results   Component Value Date/Time    ALKPHOSPHAT " of genital organs   • Dry eye syndrome   • Multiple lipomas   • Osteopenia   • Malignant neoplasm of lower-outer quadrant of left breast of female, estrogen receptor negative (CMS/HCC)   • Chemotherapy induced neutropenia (CMS/HCC)        HISTORIES:  Past medical history, surgical history, family history, and social history were reviewed and updated.  Past Medical History:   Diagnosis Date   • Anal fissure 11/22/2012   • Blepharitis of both eyes 09/02/2015   • Breast cancer (CMS/HCC) 2021    triple negative, Dr. Canseco   • Carcinoma in situ of breast 01/2010    lumpectomy, radiation, tamoxifen   • Endometriosis of pelvic peritoneum 06/05/2002   • Endometriosis, site unspecified    • Gallbladder polyp 11/10/2014    later gallbladder removed   • Hep C screen negative 08/2015   • Leiomyoma of uterus    • Osteopenia 04/03/2019   • Personal history of colonic polyps 12/01/2011    Dr. Garcia benign polyps x 2.  f/u 5 years,   • PONV (postoperative nausea and vomiting)      Past Surgical History:   Procedure Laterality Date   • Can't find surg/proc  09/11/2001    Exploratory lap/AVA due to endometriosis   • Colonoscopy diagnostic  10/12/2018    Normal - next exam due in 10 years- Dr Mi   • Colonoscopy w biopsy  12/01/2011    Dr. Garcia path: benign polyps.  f/u 5 years,   • Esophagogastroduodenoscopy transoral flex w/bx single or mult  10/12/2018    Normal -- Dr Mi   • Hysterectomy     • Hysteroscopy,bio endo/polyptmy  01/29/2013    Endometrial polyps, Tamoxifen use   • Laparoscopy, cholecystectomy  11/21/2014    Dr. Sawant   • Mri guide needle for bx, asp, inj  03/25/2010    Rt Breast biopsy-Dr. Billings   • Myomectomy  2001    Uterine myomectomy   • Pap smear  12/31/2014    normal pap, negative HPV   • Removal gallbladder     • Robotic assisted hysterectomy  03/2015    Bleeding on Tamoxifen, polyps   • Stereotactic breast biopsy  01/18/2010    Left Stereotactic Breast Bx-EMQ-   • Stereotactic breast  41 08/24/2019 03:30 PM    ASTSGOT 17 08/24/2019 03:30 PM    ALTSGPT 15 08/24/2019 03:30 PM    TBILIRUBIN 0.4 08/24/2019 03:30 PM      Lab Results   Component Value Date/Time    WBC 6.6 08/24/2019 03:30 PM     Lab Results   Component Value Date/Time    HBA1C 5.3 07/24/2019 11:20 AM     No components found for: TROP      Cardiac Imaging and Procedures Review:      EKG 2/8/21 reviewed by me normal sinus    TTE 5/28/2018  CONCLUSIONS  Normal left ventricular size, thickness, systolic function, and   diastolic function.  Left ventricular ejection fraction is visually estimated to be 65%.  Right heart pressures are normal.  No valve abnormalities.   No prior study is available for comparison.     holter 7/25/2018  Baseline rhythm sinus.  Occasional symptoms correlating with PVCs.  Short runs of ventricular tachycardia and accelerated idioventricular rhythm.  Some episodes of AIVR correlating with symptoms.     Medical Decision Making:  Problem List Items Addressed This Visit     Palpitations    Relevant Orders    EKG (Completed)    Cardiac Event Monitor        Palpitations - difficult to predict recurrence. Can attempt zio monitor. Best yield may come from apple watch or Intellinote device. Should it recur, have sent metoprolol which she can start. However, may have resolved with resolution of UTI.     It was my pleasure to meet with Ms. Quintero.                       biopsy  02/24/2010    Left Wire Loc-EMQ-   • Tonsillectomy     • Us guided breast asp/biopsy/wire loc Bilateral 06/25/2021    bilateral breast biopsy   • Uterine fibroid surgery  2001     Family History   Problem Relation Age of Onset   • Macular degeneration Mother    • Musculoskeletal Mother         Neuropathy in arms and legs   • Colon Polyps Mother         benign   • Diabetes Mother    • Cancer, Lung Mother 69        mets; heavy smoker   • Colon Polyps Father    • Cancer, Prostate Father         60s   • Heart Father         angioplasty   • Hypertension Father    • High cholesterol Father    • Cancer Father         oral - heavy smoker and drinker; possibly liver cancer (COD)   • Heart disease Brother         stent placed   • Cancer, Colon Maternal Grandmother         80s   • Heart Maternal Grandmother         CVA   • Stroke Maternal Grandmother    • Diabetes Maternal Grandmother    • Diabetes Paternal Grandmother    • COPD Paternal Grandfather    • Emphysema Paternal Grandfather    mAunt cancer, possibly lung    Social History     Tobacco Use   • Smoking status: Never Smoker   • Smokeless tobacco: Never Used   Substance Use Topics   • Alcohol use: Yes     Alcohol/week: 0.0 - 2.0 standard drinks   • Drug use: No   Lives in [place].  for [employer]. .1 son, lives in [place].  Exercise: treadmill 3-5 times per week x 45 minutes.  Diet: varied.    REVIEW OF SYSTEMS:    All other systems are reviewed and are negative except as documented in the history of present illness.    PHYSICAL EXAM:  Vital Signs:   Oncology Encounter Vitals [09/10/21 0932]   ONC OP Encounter Vitals Group      /59      Heart Rate 74      Resp 14      Temp 98 °F (36.7 °C)      Temp src Oral      SpO2 98 %      Weight 148 lb 3.2 oz (67.2 kg)      Height       Pain Score       Pain Location       Pain Education?       BSA (Calculated - m2) - Arias Biancih       BMI (Calculated)        QOPI Data:     Oral Chemo:  NA.    Psychiatric ROS: Negative for change in affect, anxiety, depression, insomnia, mentation or sleep disturbance.    ECOG Performance Status:   ECOG [09/10/21 0932]   ECOG Performance Status 0     General: The patient is alert, well-developed, well-nourished, no distress.  Skin: Warm, normal color, normal texture, normal turgor and without rash.  Head: Normocephalic, atraumatic.  Eyes: Normal conjunctivae and sclerae. Pupils equal, round.  Cardiovascular: Regular rate and rhythm, no murmurs, gallops or rubs.  Respiratory: Normal respiratory effort. Clear to auscultation. No wheezes, rales, or rhonchi.  Breasts: Left sided examination in the sitting position.  The left breast has a dominant, softer, smaller and mobile nodule in the left lower quadrant near the inframammary fold.  There are no overlying skin changes concerning for inflammatory carcinoma.   Abdomen: Abdomen is soft and non-tender with active bowel sounds. There is no detected hepatosplenomegaly, masses, or ascites.  Neurologic: Motor strength normal, coordination normal, no tremor noted.  Psychiatric: Cooperative. Appropriate mood and affect. Normal judgment.    LABORATORY DATA:  WBC (K/mcL)   Date Value   09/10/2021 15.4 (H)     RBC (mil/mcL)   Date Value   09/10/2021 3.43 (L)     HCT (%)   Date Value   09/10/2021 31.4 (L)     HGB (g/dL)   Date Value   09/10/2021 10.3 (L)     PLT (K/mcL)   Date Value   09/10/2021 170     GOT/AST (Units/L)   Date Value   08/27/2021 23     GPT/ALT (Units/L)   Date Value   08/27/2021 41     No results found for: GGTP  Alkaline Phosphatase (Units/L)   Date Value   08/27/2021 142 (H)     Bilirubin, Total (mg/dL)   Date Value   08/27/2021 0.2         IMAGING STUDIES:  Imaging studies personally viewed. The pertinent positives are as noted above.    ASSESSMENT:  1.  Triple negative left breast invasive ductal carcinoma, grade 3:  Clinically, she is responding to therapy with diminished breast mass and more mobility.  She  is tolerating chemotherapy relatively well and will proceed with weekly taxol.    We discussed the toxicities of Taxol, she indicated understanding and wants to proceed.   She enrolled on our nausea trial.     She is aware of the need to call with any toxicities or concerns and to treat side effects aggressively.      The patient,  indicated understanding of the diagnosis and agreed with the plan of care.        A copy of this note was sent to the requesting provider.

## 2021-10-15 NOTE — ED NOTES
IV started in [facility] with 20g x1 attempt. Labs drawn from start, sent to lab for processing. IV flushed with NS without difficulty. Pt tolerated well.   altered mental status

## 2021-10-20 ENCOUNTER — HOSPITAL ENCOUNTER (OUTPATIENT)
Dept: CARDIOLOGY | Facility: MEDICAL CENTER | Age: 31
End: 2021-10-20
Attending: INTERNAL MEDICINE
Payer: COMMERCIAL

## 2021-10-20 DIAGNOSIS — R00.2 PALPITATIONS: ICD-10-CM

## 2021-10-20 LAB
LV EJECT FRACT  99904: 60
LV EJECT FRACT MOD 2C 99903: 70.73
LV EJECT FRACT MOD 4C 99902: 59.92
LV EJECT FRACT MOD BP 99901: 64.43

## 2021-10-20 PROCEDURE — 93306 TTE W/DOPPLER COMPLETE: CPT | Mod: 26 | Performed by: INTERNAL MEDICINE

## 2021-10-20 PROCEDURE — 93306 TTE W/DOPPLER COMPLETE: CPT

## 2022-05-24 ENCOUNTER — TELEPHONE (OUTPATIENT)
Dept: MEDICAL GROUP | Facility: CLINIC | Age: 32
End: 2022-05-24
Payer: COMMERCIAL

## 2022-05-24 NOTE — TELEPHONE ENCOUNTER
Patient called requesting lab work as she was tested last year and came up pre-diabetic. Since she has not been seen for one year, I had her schedule an appointment with Dr. Baig to re-establish.

## 2022-06-27 ENCOUNTER — HOSPITAL ENCOUNTER (OUTPATIENT)
Dept: LAB | Facility: MEDICAL CENTER | Age: 32
End: 2022-06-27
Attending: OBSTETRICS & GYNECOLOGY
Payer: COMMERCIAL

## 2022-06-27 LAB — B-HCG SERPL-ACNC: <1 MIU/ML (ref 0–5)

## 2022-06-27 PROCEDURE — 84702 CHORIONIC GONADOTROPIN TEST: CPT

## 2022-06-27 PROCEDURE — 36415 COLL VENOUS BLD VENIPUNCTURE: CPT

## 2022-08-18 ENCOUNTER — OFFICE VISIT (OUTPATIENT)
Dept: MEDICAL GROUP | Facility: CLINIC | Age: 32
End: 2022-08-18
Payer: COMMERCIAL

## 2022-08-18 DIAGNOSIS — H43.393 VITREOUS FLOATERS OF BOTH EYES: ICD-10-CM

## 2022-08-18 DIAGNOSIS — J34.2 DEVIATED SEPTUM: ICD-10-CM

## 2022-08-18 DIAGNOSIS — R73.03 PRE-DIABETES: ICD-10-CM

## 2022-08-18 DIAGNOSIS — Z80.3 FAMILY HISTORY OF BREAST CANCER: ICD-10-CM

## 2022-08-18 DIAGNOSIS — D56.3 BETA THALASSEMIA TRAIT: ICD-10-CM

## 2022-08-18 DIAGNOSIS — J34.2 DEVIATED NASAL SEPTUM: ICD-10-CM

## 2022-08-18 DIAGNOSIS — Z00.00 ROUTINE HEALTH MAINTENANCE: ICD-10-CM

## 2022-08-18 DIAGNOSIS — R16.1 SPLENOMEGALY: ICD-10-CM

## 2022-08-18 LAB
HBA1C MFR BLD: 5.9 % (ref 0–5.6)
INT CON NEG: ABNORMAL
INT CON POS: ABNORMAL

## 2022-08-18 PROCEDURE — 83036 HEMOGLOBIN GLYCOSYLATED A1C: CPT | Performed by: FAMILY MEDICINE

## 2022-08-18 PROCEDURE — 99395 PREV VISIT EST AGE 18-39: CPT | Mod: EP | Performed by: FAMILY MEDICINE

## 2022-08-18 NOTE — ASSESSMENT & PLAN NOTE
Having floaters in both eyes for the past 6 months  Has optometry appointment coming up  Referral to ophthalmology as well per patient request  Exam benign in clinic today though patient did not have dilated eye exam today

## 2022-08-18 NOTE — ASSESSMENT & PLAN NOTE
Hemoglobin A1c 5.9 in clinic today  Strong family history of diabetes  Had discussion about therapeutic lifestyle changes  Also discussed medication options including metformin and GLP-1 agonists  Patient wants to start with diet and exercise for now, will recheck in 3 months

## 2022-08-18 NOTE — ASSESSMENT & PLAN NOTE
Has Pap smear appointment coming up with gynecology next week  Declines COVID-vaccine  Labs ordered today to evaluate metabolic health  Also discussed healthy [place] project- and RAMIREZ study  Declines STI testing, and stable monogamous relationship with 1 male partner  Had extensive discussion about lifestyle changes including reduced sugary beverages, healthful nutrition, increased fiber and healthy carbohydrates, physical activity, reduced processed foods, and reduced window of eating during the day  Patient to return in 3 months to repeat hemoglobin A1c and follow-up on this discussion

## 2022-08-18 NOTE — PROGRESS NOTES
Subjective:     CC: Annual well visit    HPI:   Francois was seen by me once previously on 2020 at [facility]  She is here today for an annual well visit    6 months ago - started getting eye floaters, bilateral, come and go  Occur mostly outside, when bright and driving  Has eye appt with [facility] coming up next week  No eye pain or sudden vision loss  Has headaches pretty often, floaters not correlated with them    Diet:  Drinks a lot of frappes and red bulls  Worried about developing diabetes d/t strong fam hx    Recently had nausea and dry heaving, went away a few weeks ago  Also recently had R neck swelling, went away  Got pregnancy test, was negative    Past medical history:  Anxiety  Throat closing sensation (once in a while, not as often)  Neck pain (difficulty breathing out of one side)  Deviated septum  Thalassemia beta minor      Past surgical history: None    Family history:  Mother with hypertension, cervical cancer or ovarian cancer, skin cancer, diabetes, thalasemia minor  Maternal Uncle with colon cancer  Maternal grandmother with breast cancer  Maternal grandfather with diabetes, leukemia  Paternal grandfather with diabetes    Social Hx:  Never smoker  ETOH - none  No drug use  2 children  Sexually active  Declines hormonal birth control    Preventive:  pap smear is scheduled for Monday next week with gynecology  Declines covid vaccine    Problem   Vitreous Floaters of Both Eyes   Pre-Diabetes   Deviated septum    IMO load 2020     Routine Health Maintenance       No current Epic-ordered outpatient medications on file.     No current [facility]-ordered facility-administered medications on file.       Past Medical History:   Diagnosis Date    Asthma         No past surgical history on file.     Family History   Problem Relation Age of Onset    Cancer Mother         skin,ovarian    Cancer Paternal Uncle     Cancer Maternal Grandmother         breast          ROS:  Gen: no  "fevers/chills, no weight loss  HEENT: +floaters in vision  Neck: +Recent neck swelling, resolved  Pulm: no sob, no cough  CV: no chest pain, no palpitations  GI: recent nausea - resolved  : no dysuria  MSk: no myalgias  Skin: no rash  Neuro: +headaches, no weakness      Objective:     Exam:  BP (P) 115/75 (BP Location: Left arm, Patient Position: Sitting, BP Cuff Size: Large adult)   Pulse (P) 97   Ht (P) 1.676 m (5' 6\")   Wt (P) 100 kg (220 lb 6.4 oz)   SpO2 (P) 95%   BMI (P) 35.57 kg/m²  Body mass index is 35.57 kg/m² (pended).    Gen: Alert and oriented, No apparent distress. overweight  Head:  NCAT, EOMI, sclera clear without discharge, OP clear, fundoscopic exam limited though appears normal  Neck: Neck is supple without lymphadenopathy.  Lungs: Normal effort, CTA bilaterally, no wheezes, rhonchi, or rales  CV: Regular rate and rhythm. No murmurs, rubs, or gallops.  Abd:   Non-distended, soft, non-tender, no organomegaly appreciated  Ext: No clubbing, cyanosis, edema.  MSK: Unassisted gait  Derm: No lesions on exposed skin, no jaundice  Psych: normal mood and affect, mildly anxious        Assessment & Plan:     32 y.o. female with the following -     Problem List Items Addressed This Visit       Routine health maintenance     Has Pap smear appointment coming up with gynecology next week  Declines COVID-vaccine  Labs ordered today to evaluate metabolic health  Also discussed healthy [place] project- and RAMIREZ study  Declines STI testing, and stable monogamous relationship with 1 male partner  Had extensive discussion about lifestyle changes including reduced sugary beverages, healthful nutrition, increased fiber and healthy carbohydrates, physical activity, reduced processed foods, and reduced window of eating during the day  Patient to return in 3 months to repeat hemoglobin A1c and follow-up on this discussion         Beta thalassemia trait    Relevant Orders    CBC WITH DIFFERENTIAL    Deviated septum    "  Chronic condition, stable  Unable to breathe of one side of her nose  Saw ENT in the past and was told she had to go to [place] for repair.  Has different insurance now wants to try again.  Referred to ENT         Vitreous floaters of both eyes     Having floaters in both eyes for the past 6 months  Has optometry appointment coming up  Referral to ophthalmology as well per patient request  Exam benign in clinic today though patient did not have dilated eye exam today         Relevant Orders    Referral to Ophthalmology    Pre-diabetes     Hemoglobin A1c 5.9 in clinic today  Strong family history of diabetes  Had discussion about therapeutic lifestyle changes  Also discussed medication options including metformin and GLP-1 agonists  Patient wants to start with diet and exercise for now, will recheck in 3 months           Relevant Orders    POCT A1C (Completed)    Comp Metabolic Panel     Other Visit Diagnoses       Deviated septum        Relevant Orders    Referral to ENT    Family history of breast cancer        Relevant Orders    Referral to Genetic Research Studies            Follow-up: 3 months

## 2022-08-18 NOTE — ASSESSMENT & PLAN NOTE
Chronic condition, stable  Unable to breathe of one side of her nose  Saw ENT in the past and was told she had to go to [place] for repair.  Has different insurance now wants to try again.  Referred to ENT

## 2022-08-30 ENCOUNTER — RESEARCH ENCOUNTER (OUTPATIENT)
Dept: MEDICAL GROUP | Facility: PHYSICIAN GROUP | Age: 32
End: 2022-08-30
Payer: COMMERCIAL

## 2022-08-30 DIAGNOSIS — Z00.6 RESEARCH STUDY PATIENT: ICD-10-CM

## 2022-09-17 NOTE — H&P
DATE OF ADMISSION:  10/24/2018    CHIEF COMPLAINT:  Painful contractions.    HISTORY OF PRESENT ILLNESS:  Patient is a 28-year-old  2, para 1 female   due on , presented to labor and delivery for labor evaluation.  She   initially arrived and she was 3+ cm dilated and was allowed to walk.  When she   was rechecked approximately 3 hours later, she was 5 cm dilated, I was called   for the admission around 9:00 a.m.    PRENATAL HISTORY:  The patient presented prenatal care around 10 weeks'   gestation, has had care through Dr. Alison Jalloh.  There is no known risk   factors.    OBSTETRICAL HISTORY:  The patient has had a previous term vaginal delivery of   a healthy female infant.    MEDICAL HISTORY:  The patient is in good health.  She does have a history of   mild anemia and asthma.    ALLERGIES:  None known.    FAMILY HISTORY:  Noncontributory.    SOCIAL HISTORY:  She is .  She does not smoke.    REVIEW OF SYSTEMS:  She had denied any leaking or vaginal bleeding when she   presented.    PHYSICAL EXAMINATION:  GENERAL:  She is in moderate distress with contractions.  LUNGS:  Clear to auscultation.  HEART:  Regular rate and rhythm.  ABDOMEN:  Fetal heart tones are present with contractions every 3-4 minutes.  GYNECOLOGIC:  Per the nurse admit 5 cm dilated.    IMPRESSION:  On admission was term pregnancy, in active labor.    PLAN:  Plan would be anticipate vaginal delivery.  The patient was desirous of   epidural.       ____________________________________     MD ALEX OGNZALES / PABLO    DD:  10/24/2018 11:49:57  DT:  10/24/2018 11:57:38    D#:  [id number]  Job#:  [id number]   COPD exacerbation

## 2022-09-25 LAB
APOB+LDLR+PCSK9 GENE MUT ANL BLD/T: NOT DETECTED
BRCA1+BRCA2 DEL+DUP + FULL MUT ANL BLD/T: NOT DETECTED
MLH1+MSH2+MSH6+PMS2 GN DEL+DUP+FUL M: NOT DETECTED

## 2022-09-29 ENCOUNTER — HOSPITAL ENCOUNTER (OUTPATIENT)
Dept: RADIOLOGY | Facility: MEDICAL CENTER | Age: 32
End: 2022-09-29
Attending: FAMILY MEDICINE
Payer: COMMERCIAL

## 2022-09-29 DIAGNOSIS — R16.1 SPLENOMEGALY: ICD-10-CM

## 2022-09-29 PROCEDURE — 76700 US EXAM ABDOM COMPLETE: CPT

## 2022-10-14 ENCOUNTER — HOSPITAL ENCOUNTER (OUTPATIENT)
Dept: LAB | Facility: MEDICAL CENTER | Age: 32
End: 2022-10-14
Attending: FAMILY MEDICINE
Payer: COMMERCIAL

## 2022-10-14 DIAGNOSIS — R73.03 PRE-DIABETES: ICD-10-CM

## 2022-10-14 DIAGNOSIS — D56.3 BETA THALASSEMIA TRAIT: ICD-10-CM

## 2022-10-14 LAB
ALBUMIN SERPL BCP-MCNC: 4.4 G/DL (ref 3.2–4.9)
ALBUMIN/GLOB SERPL: 1.6 G/DL
ALP SERPL-CCNC: 41 U/L (ref 30–99)
ALT SERPL-CCNC: 44 U/L (ref 2–50)
ANION GAP SERPL CALC-SCNC: 14 MMOL/L (ref 7–16)
AST SERPL-CCNC: 34 U/L (ref 12–45)
BASOPHILS # BLD AUTO: 0.9 % (ref 0–1.8)
BASOPHILS # BLD: 0.06 K/UL (ref 0–0.12)
BILIRUB SERPL-MCNC: 0.5 MG/DL (ref 0.1–1.5)
BUN SERPL-MCNC: 5 MG/DL (ref 8–22)
CALCIUM SERPL-MCNC: 9.4 MG/DL (ref 8.5–10.5)
CHLORIDE SERPL-SCNC: 103 MMOL/L (ref 96–112)
CO2 SERPL-SCNC: 24 MMOL/L (ref 20–33)
CREAT SERPL-MCNC: 0.94 MG/DL (ref 0.5–1.4)
EOSINOPHIL # BLD AUTO: 0.53 K/UL (ref 0–0.51)
EOSINOPHIL NFR BLD: 8 % (ref 0–6.9)
ERYTHROCYTE [DISTWIDTH] IN BLOOD BY AUTOMATED COUNT: 36.8 FL (ref 35.9–50)
GFR SERPLBLD CREATININE-BSD FMLA CKD-EPI: 83 ML/MIN/1.73 M 2
GLOBULIN SER CALC-MCNC: 2.7 G/DL (ref 1.9–3.5)
GLUCOSE SERPL-MCNC: 94 MG/DL (ref 65–99)
HCT VFR BLD AUTO: 42.4 % (ref 37–47)
HGB BLD-MCNC: 12.9 G/DL (ref 12–16)
IMM GRANULOCYTES # BLD AUTO: 0.01 K/UL (ref 0–0.11)
IMM GRANULOCYTES NFR BLD AUTO: 0.2 % (ref 0–0.9)
LYMPHOCYTES # BLD AUTO: 3.01 K/UL (ref 1–4.8)
LYMPHOCYTES NFR BLD: 45.5 % (ref 22–41)
MCH RBC QN AUTO: 20.3 PG (ref 27–33)
MCHC RBC AUTO-ENTMCNC: 30.4 G/DL (ref 33.6–35)
MCV RBC AUTO: 66.9 FL (ref 81.4–97.8)
MONOCYTES # BLD AUTO: 0.47 K/UL (ref 0–0.85)
MONOCYTES NFR BLD AUTO: 7.1 % (ref 0–13.4)
NEUTROPHILS # BLD AUTO: 2.53 K/UL (ref 2–7.15)
NEUTROPHILS NFR BLD: 38.3 % (ref 44–72)
NRBC # BLD AUTO: 0 K/UL
NRBC BLD-RTO: 0 /100 WBC
PLATELET # BLD AUTO: 341 K/UL (ref 164–446)
PMV BLD AUTO: 11 FL (ref 9–12.9)
POTASSIUM SERPL-SCNC: 3.6 MMOL/L (ref 3.6–5.5)
PROT SERPL-MCNC: 7.1 G/DL (ref 6–8.2)
RBC # BLD AUTO: 6.34 M/UL (ref 4.2–5.4)
SODIUM SERPL-SCNC: 141 MMOL/L (ref 135–145)
WBC # BLD AUTO: 6.6 K/UL (ref 4.8–10.8)

## 2022-10-14 PROCEDURE — 85025 COMPLETE CBC W/AUTO DIFF WBC: CPT

## 2022-10-14 PROCEDURE — 80053 COMPREHEN METABOLIC PANEL: CPT

## 2022-10-14 PROCEDURE — 36415 COLL VENOUS BLD VENIPUNCTURE: CPT

## 2022-10-20 NOTE — RESULT ENCOUNTER NOTE
"Released results via [employer]:    \"Aly Parrish  Your blood test results are mostly normal.  Your liver function looks good.  I will go over the results in detail at your upcoming appointment.  Nothing to worry about in the meantime.  Look forward to seeing you on 11/9.    Sincerely,  Dr. Baig\""

## 2022-11-11 ENCOUNTER — TELEPHONE (OUTPATIENT)
Dept: SCHEDULING | Facility: IMAGING CENTER | Age: 32
End: 2022-11-11

## 2022-11-11 ENCOUNTER — PATIENT MESSAGE (OUTPATIENT)
Dept: MEDICAL GROUP | Facility: CLINIC | Age: 32
End: 2022-11-11
Payer: COMMERCIAL

## 2022-12-04 ENCOUNTER — HOSPITAL ENCOUNTER (EMERGENCY)
Facility: MEDICAL CENTER | Age: 32
End: 2022-12-04
Attending: EMERGENCY MEDICINE
Payer: COMMERCIAL

## 2022-12-04 VITALS
HEART RATE: 91 BPM | SYSTOLIC BLOOD PRESSURE: 119 MMHG | BODY MASS INDEX: 31.39 KG/M2 | RESPIRATION RATE: 16 BRPM | HEIGHT: 67 IN | TEMPERATURE: 98 F | DIASTOLIC BLOOD PRESSURE: 72 MMHG | OXYGEN SATURATION: 98 % | WEIGHT: 200 LBS

## 2022-12-04 DIAGNOSIS — I30.9 ACUTE PERICARDITIS, UNSPECIFIED TYPE: ICD-10-CM

## 2022-12-04 DIAGNOSIS — R00.0 TACHYCARDIA: ICD-10-CM

## 2022-12-04 LAB
ANION GAP SERPL CALC-SCNC: 11 MMOL/L (ref 7–16)
APPEARANCE UR: CLEAR
BACTERIA #/AREA URNS HPF: ABNORMAL /HPF
BASOPHILS # BLD AUTO: 0.9 % (ref 0–1.8)
BASOPHILS # BLD: 0.07 K/UL (ref 0–0.12)
BILIRUB UR QL STRIP.AUTO: NEGATIVE
BUN SERPL-MCNC: 11 MG/DL (ref 8–22)
CALCIUM SERPL-MCNC: 9.5 MG/DL (ref 8.4–10.2)
CHLORIDE SERPL-SCNC: 101 MMOL/L (ref 96–112)
CO2 SERPL-SCNC: 23 MMOL/L (ref 20–33)
COLOR UR: YELLOW
CREAT SERPL-MCNC: 0.97 MG/DL (ref 0.5–1.4)
EKG IMPRESSION: NORMAL
EOSINOPHIL # BLD AUTO: 0.24 K/UL (ref 0–0.51)
EOSINOPHIL NFR BLD: 2.9 % (ref 0–6.9)
EPI CELLS #/AREA URNS HPF: ABNORMAL /HPF
ERYTHROCYTE [DISTWIDTH] IN BLOOD BY AUTOMATED COUNT: 31.7 FL (ref 35.9–50)
GFR SERPLBLD CREATININE-BSD FMLA CKD-EPI: 80 ML/MIN/1.73 M 2
GLUCOSE SERPL-MCNC: 124 MG/DL (ref 65–99)
GLUCOSE UR STRIP.AUTO-MCNC: NEGATIVE MG/DL
HCG SERPL QL: NEGATIVE
HCT VFR BLD AUTO: 40.3 % (ref 37–47)
HGB BLD-MCNC: 12.7 G/DL (ref 12–16)
IMM GRANULOCYTES # BLD AUTO: 0.02 K/UL (ref 0–0.11)
IMM GRANULOCYTES NFR BLD AUTO: 0.2 % (ref 0–0.9)
KETONES UR STRIP.AUTO-MCNC: NEGATIVE MG/DL
LEUKOCYTE ESTERASE UR QL STRIP.AUTO: NEGATIVE
LYMPHOCYTES # BLD AUTO: 2.48 K/UL (ref 1–4.8)
LYMPHOCYTES NFR BLD: 30.3 % (ref 22–41)
MCH RBC QN AUTO: 20.3 PG (ref 27–33)
MCHC RBC AUTO-ENTMCNC: 31.5 G/DL (ref 33.6–35)
MCV RBC AUTO: 64.5 FL (ref 81.4–97.8)
MICRO URNS: ABNORMAL
MONOCYTES # BLD AUTO: 0.61 K/UL (ref 0–0.85)
MONOCYTES NFR BLD AUTO: 7.5 % (ref 0–13.4)
MUCOUS THREADS #/AREA URNS HPF: ABNORMAL /HPF
NEUTROPHILS # BLD AUTO: 4.76 K/UL (ref 2–7.15)
NEUTROPHILS NFR BLD: 58.2 % (ref 44–72)
NITRITE UR QL STRIP.AUTO: NEGATIVE
NRBC # BLD AUTO: 0 K/UL
NRBC BLD-RTO: 0 /100 WBC
PH UR STRIP.AUTO: 6 [PH] (ref 5–8)
PLATELET # BLD AUTO: 367 K/UL (ref 164–446)
PMV BLD AUTO: 10 FL (ref 9–12.9)
POTASSIUM SERPL-SCNC: 4.1 MMOL/L (ref 3.6–5.5)
PROT UR QL STRIP: NEGATIVE MG/DL
RBC # BLD AUTO: 6.25 M/UL (ref 4.2–5.4)
RBC # URNS HPF: ABNORMAL /HPF
RBC UR QL AUTO: ABNORMAL
SODIUM SERPL-SCNC: 135 MMOL/L (ref 135–145)
SP GR UR STRIP.AUTO: 1.01
TROPONIN T SERPL-MCNC: <6 NG/L (ref 6–19)
TSH SERPL DL<=0.005 MIU/L-ACNC: 3.75 UIU/ML (ref 0.38–5.33)
WBC # BLD AUTO: 8.2 K/UL (ref 4.8–10.8)
WBC #/AREA URNS HPF: ABNORMAL /HPF

## 2022-12-04 PROCEDURE — 93005 ELECTROCARDIOGRAM TRACING: CPT | Performed by: EMERGENCY MEDICINE

## 2022-12-04 PROCEDURE — 80048 BASIC METABOLIC PNL TOTAL CA: CPT

## 2022-12-04 PROCEDURE — 99284 EMERGENCY DEPT VISIT MOD MDM: CPT

## 2022-12-04 PROCEDURE — 84484 ASSAY OF TROPONIN QUANT: CPT

## 2022-12-04 PROCEDURE — 36415 COLL VENOUS BLD VENIPUNCTURE: CPT

## 2022-12-04 PROCEDURE — 81001 URINALYSIS AUTO W/SCOPE: CPT

## 2022-12-04 PROCEDURE — 84443 ASSAY THYROID STIM HORMONE: CPT

## 2022-12-04 PROCEDURE — 85025 COMPLETE CBC W/AUTO DIFF WBC: CPT

## 2022-12-04 PROCEDURE — 83036 HEMOGLOBIN GLYCOSYLATED A1C: CPT

## 2022-12-04 PROCEDURE — 84703 CHORIONIC GONADOTROPIN ASSAY: CPT

## 2022-12-04 ASSESSMENT — LIFESTYLE VARIABLES
EVER HAD A DRINK FIRST THING IN THE MORNING TO STEADY YOUR NERVES TO GET RID OF A HANGOVER: NO
HAVE PEOPLE ANNOYED YOU BY CRITICIZING YOUR DRINKING: NO
DO YOU DRINK ALCOHOL: NO
TOTAL SCORE: 0
HAVE YOU EVER FELT YOU SHOULD CUT DOWN ON YOUR DRINKING: NO
TOTAL SCORE: 0
CONSUMPTION TOTAL: INCOMPLETE
EVER FELT BAD OR GUILTY ABOUT YOUR DRINKING: NO
TOTAL SCORE: 0

## 2022-12-04 ASSESSMENT — FIBROSIS 4 INDEX: FIB4 SCORE: 0.47

## 2022-12-05 LAB
EST. AVERAGE GLUCOSE BLD GHB EST-MCNC: 117 MG/DL
HBA1C MFR BLD: 5.7 % (ref 4–5.6)

## 2022-12-05 NOTE — ED NOTES
Pt reports palpitations when going from sitting to standing which prompted her to come in for eval.   Denies cp/sob.  Heart RRR.  BLS CTA.  Skin PWD.  NAD.  WCTM.

## 2022-12-05 NOTE — DISCHARGE INSTRUCTIONS
You may have a viral syndrome such as pericarditis.  Rest, drink fluids and take ibuprofen 600 mg 3-4 times a day unless it upsets the stomach.  Follow-up with your doctor if not feeling better in a week.  Return to the ER for fainting, heart rate persistently over 120, severe dizziness or other concerning symptoms.  This is not expected.

## 2022-12-05 NOTE — ED TRIAGE NOTES
Patient presents to the ER with the following complaints:  Chief Complaint   Patient presents with    Rapid Heart Beat     Patient presents to the ER with a heart rate in the 90s while sitting and 110s when standing. Patient  feels as though her heart is bounding in her chest.        Chief Complaint   Patient presents with    Rapid Heart Beat     Patient presents to the ER with a heart rate in the 90s while sitting and 110s when standing. Patient  feels as though her heart is bounding in her chest.

## 2022-12-05 NOTE — ED PROVIDER NOTES
ED Provider Note    Scribed for Devan Eason M.D. by Pooja Hairston. 12/4/2022  8:29 PM    Primary care provider: Marie Baig M.D.    CHIEF COMPLAINT  Chief Complaint   Patient presents with    Rapid Heart Beat     Patient presents to the ER with a heart rate in the 90s while sitting and 110s when standing. Patient  feels as though her heart is bounding in her chest.        GUS Quintero is a 32 y.o. female who presents to the Emergency Department for a fast heart rate onset a few hours ago. Patient states she was at the store when she began to feel dizzy and noticed she had a fast heart rate. She states her dizziness has resolved but her heart rate has not gone below 100 since then, except when she is sitting it drops to the 90's. She does report some nausea, headache and diarrhea but denies any chest pain, fever, cough, sore throat or vomit. She states she is unsure if she is pregnant and during her last pregnancy she had some heart issues. She states she followed with a cardiologist over a year ago for a heart rate in the 200's but those symptoms have resolved.  She does not know the name of the arrhythmia she has been and is not on any medication to prevent further recurrence because her cardiologist felt it was not necessary.  She does report a history of prediabetes and the beginning of fatty liver disease but denies a history of thyroid disease.    REVIEW OF SYSTEMS  Pertinent positives include: tachycardia, nausea, headache and diarrhea.  Pertinent negatives include: chest pain, fever, cough, sore throat or vomit.  10+ systems reviewed and negative.      PAST MEDICAL HISTORY  Past Medical History:   Diagnosis Date    Asthma        FAMILY HISTORY  Family History   Problem Relation Age of Onset    Cancer Mother         skin,ovarian    Cancer Paternal Uncle     Cancer Maternal Grandmother         breast       SOCIAL HISTORY  Social History     Tobacco Use    Smoking status: Never    Smokeless  "tobacco: Never   Substance Use Topics    Alcohol use: No    Drug use: No     Social History     Substance and Sexual Activity   Drug Use No       CURRENT MEDICATIONS  Home Medications       Reviewed by Richard Hampton R.N. (Registered Nurse) on 12/04/22 at 2027  Med List Status: Not Addressed     Medication Last Dose Status        Patient Chon Taking any Medications                           ALLERGIES  No Known Allergies    PHYSICAL EXAM  VITAL SIGNS: /79   Pulse 95   Resp 20   Ht 1.702 m (5' 7\")   Wt 90.7 kg (200 lb)   LMP 11/19/2022 (Exact Date)   SpO2 99%   BMI 31.32 kg/m²   Reviewed and normal heart rate, borderline tachypneic  Constitutional: Well developed, Well nourished.  Elevated BMI  HENT: Normocephalic, atraumatic, bilateral external ears normal, wearing a mask.   Eyes: PERRLA, conjunctiva pink, no scleral icterus.   Cardiovascular: Regular rhythm. Tachycardic, No murmurs, rubs or gallops.  No dependent edema or calf tenderness.  Pulses symmetric.  Respiratory: Lungs clear to auscultation bilaterally. No wheezes, rales, or rhonchi.  Abdominal:  Abdomen soft, non-tender, non distended. No rebound, or guarding.    Skin: No erythema, no rash.   Genitourinary: No costovertebral angle tenderness.   Musculoskeletal: no deformities.   Neurologic: Alert & oriented x 3, cranial nerves 2-12 intact by passive exam.  No focal deficit noted.  Psychiatric: Affect normal, Judgment normal, Mood normal.     DIFFERENTIAL DIAGNOSIS:  Sinus tachycardia, dehydration, viral syndrome, anxiety, SVT A-fib.    EKG Interpretation:  Interpreted by me    Rhythm:  Normal sinus rhythm   Rate: 95  Axis: normal  Ectopy: none  Conduction: normal  ST Segments: Nonspecific diffuse ST changes T Waves: no acute change  Q Waves: none  Clinical Impression: Normal sinus rhythm with possible pericarditis    LABORATORY:  Results for orders placed or performed during the hospital encounter of 12/04/22   CBC WITH DIFFERENTIAL   Result " Value Ref Range    WBC 8.2 4.8 - 10.8 K/uL    RBC 6.25 (H) 4.20 - 5.40 M/uL    Hemoglobin 12.7 12.0 - 16.0 g/dL    Hematocrit 40.3 37.0 - 47.0 %    MCV 64.5 (L) 81.4 - 97.8 fL    MCH 20.3 (L) 27.0 - 33.0 pg    MCHC 31.5 (L) 33.6 - 35.0 g/dL    RDW 31.7 (L) 35.9 - 50.0 fL    Platelet Count 367 164 - 446 K/uL    MPV 10.0 9.0 - 12.9 fL    Neutrophils-Polys 58.20 44.00 - 72.00 %    Lymphocytes 30.30 22.00 - 41.00 %    Monocytes 7.50 0.00 - 13.40 %    Eosinophils 2.90 0.00 - 6.90 %    Basophils 0.90 0.00 - 1.80 %    Immature Granulocytes 0.20 0.00 - 0.90 %    Nucleated RBC 0.00 /100 WBC    Neutrophils (Absolute) 4.76 2.00 - 7.15 K/uL    Lymphs (Absolute) 2.48 1.00 - 4.80 K/uL    Monos (Absolute) 0.61 0.00 - 0.85 K/uL    Eos (Absolute) 0.24 0.00 - 0.51 K/uL    Baso (Absolute) 0.07 0.00 - 0.12 K/uL    Immature Granulocytes (abs) 0.02 0.00 - 0.11 K/uL    NRBC (Absolute) 0.00 K/uL   Basic Metabolic Panel   Result Value Ref Range    Sodium 135 135 - 145 mmol/L    Potassium 4.1 3.6 - 5.5 mmol/L    Chloride 101 96 - 112 mmol/L    Co2 23 20 - 33 mmol/L    Glucose 124 (H) 65 - 99 mg/dL    Bun 11 8 - 22 mg/dL    Creatinine 0.97 0.50 - 1.40 mg/dL    Calcium 9.5 8.4 - 10.2 mg/dL    Anion Gap 11.0 7.0 - 16.0   TSH   Result Value Ref Range    TSH 3.750 0.380 - 5.330 uIU/mL   URINALYSIS    Specimen: Urine   Result Value Ref Range    Color Yellow     Character Clear     Specific Gravity 1.010 <1.035    Ph 6.0 5.0 - 8.0    Glucose Negative Negative mg/dL    Ketones Negative Negative mg/dL    Protein Negative Negative mg/dL    Bilirubin Negative Negative    Nitrite Negative Negative    Leukocyte Esterase Negative Negative    Occult Blood Small (A) Negative    Micro Urine Req Microscopic    BETA-HCG QUALITATIVE SERUM   Result Value Ref Range    Beta-Hcg Qualitative Serum Negative Negative   TROPONIN   Result Value Ref Range    Troponin T <6 6 - 19 ng/L   URINE MICROSCOPIC (W/UA)   Result Value Ref Range    WBC 0-2 /hpf    RBC 2-5 (A) /hpf     Bacteria Few (A) None /hpf    Epithelial Cells Few Few /hpf    Mucous Threads Rare /hpf       Lab results reviewed by me.     INTERVENTIONS:  Patient was not orthostatic by vital signs although she did develop a mild tachycardia on standing    ED COURSE:  Nursing notes, VS, PMSFHx reviewed in chart.     8:29 PM - Patient seen and examined at bedside.  Ordered EKG, troponin, CBC-diff, BMP, TSH, UA, and beta-HCG qualitative to evaluate.     MEDICAL DECISION MAKING:  Patient with a history of prior unspecified arrhythmia presents with dizziness and mild sinus tachycardia with standing.  She has had diarrhea and has diffuse nonspecific EKG changes and may have a viral syndrome such as early pericarditis.  There is no evidence of myocarditis based on troponin.  There are certainly no myocardial ischemia.  There is no thyroid disease.  She does not appear to be dehydrated.  She does not have evidence of UTI sepsis or pregnancy.  With no chest pain dyspnea or hypoxia PE is unlikely.    PLAN:  Reassurance  NSAIDs and oral fluids  Pericarditis handout given  Return for syncope, persistent heart rate over 120    Marie Baig M.D.  [address] [facility] [address] [phone]    Schedule an appointment as soon as possible for a visit in 1 week  As needed if not better    CONDITION: Stable.     FINAL IMPRESSION  1. Tachycardia    2. Acute pericarditis, unspecified type          I, Pooja Hairston (Jane), am scribing for, and in the presence of, Devan Eason M.D..    Electronically signed by: Pooja Hairston (Rickibmando), 12/4/2022    IDevan M.D. personally performed the services described in this documentation, as scribed by Pooja Hairston in my presence, and it is both accurate and complete.    The note accurately reflects work and decisions made by me.  Devan Eason M.D.  12/4/2022  10:44 PM

## 2022-12-09 ENCOUNTER — OFFICE VISIT (OUTPATIENT)
Dept: MEDICAL GROUP | Facility: CLINIC | Age: 32
End: 2022-12-09
Payer: COMMERCIAL

## 2022-12-09 DIAGNOSIS — R00.2 PALPITATIONS: ICD-10-CM

## 2022-12-09 DIAGNOSIS — L82.1 SEBORRHEIC KERATOSES: ICD-10-CM

## 2022-12-09 DIAGNOSIS — R73.03 PRE-DIABETES: ICD-10-CM

## 2022-12-09 DIAGNOSIS — I47.10 SVT (SUPRAVENTRICULAR TACHYCARDIA) (HCC): ICD-10-CM

## 2022-12-09 PROCEDURE — 99214 OFFICE O/P EST MOD 30 MIN: CPT | Mod: GC | Performed by: STUDENT IN AN ORGANIZED HEALTH CARE EDUCATION/TRAINING PROGRAM

## 2022-12-09 RX ORDER — METOPROLOL SUCCINATE 25 MG/1
TABLET, EXTENDED RELEASE ORAL
Qty: 30 TABLET | Refills: 3 | Status: SHIPPED | OUTPATIENT
Start: 2022-12-09 | End: 2022-12-27 | Stop reason: SDUPTHER

## 2022-12-09 ASSESSMENT — FIBROSIS 4 INDEX: FIB4 SCORE: 0.43

## 2022-12-09 NOTE — PROGRESS NOTES
"Subjective:     CC: ER follow up    HPI:   Francois presents today with:    Problem   Seborrheic Keratoses    Patient has several moles on her back that she would like to be examined.  Recently, she is noticing 1 mole in particular on her back near her bra line that is somewhat itchy.  No bleeding or discharge.     Pre-Diabetes    She reports 20 pound weight loss in the last year since her diagnosis of prediabetes.     Palpitations    Patient presents as a follow-up from the ER on December 4 for tachycardia associated with lightheadedness.  Patient reports a longstanding history of palpitations and elevated heart rate, initially associated with pregnancy and then recurred in 2021 and then again more recently.  Patient does not attribute this to increased stress or anxiety in her life.  She does not know what to attribute this to.  Patient is very concerned about the risk of stroke.  No syncope associated with this.  No chest pain associated with this.         Current Outpatient Medications Ordered in Epic   Medication Sig Dispense Refill    metoprolol SR (TOPROL XL) 25 MG TABLET SR 24 HR Take a half tablet by mouth once a day 30 Tablet 3     No current Epic-ordered facility-administered medications on file.       Objective:     Exam:  BP (P) 123/85   Pulse (P) 88   Resp (P) 18   Ht (P) 1.676 m (5' 6\")   Wt (P) 90.3 kg (199 lb)   LMP 11/19/2022 (Exact Date)   SpO2 (P) 98%   BMI (P) 32.12 kg/m²  Body mass index is 32.12 kg/m² (pended).    General: Normal appearing. No distress.  HEENT: Normocephalic. Eyes conjunctiva clear lids without ptosis, pupils equal and reactive to light accommodation, ears normal shape and contour, canals are clear bilaterally, tympanic membranes are benign, nasal mucosa benign, oropharynx is without erythema, edema or exudates.   Neck: Supple without JVD or bruit. Thyroid is not enlarged.  Pulmonary: Clear to ausculation.  Normal effort. No rales, ronchi, or " wheezing.  Cardiovascular: Regular rate and rhythm without murmur.   Neurologic: Grossly nonfocal  Lymph: No cervical or supraclavicular lymph nodes are palpable  Skin: Warm and dry.  No obvious lesions.  Musculoskeletal: Normal gait. No extremity cyanosis, clubbing, or edema.  Psych: Normal mood and affect. Alert and oriented x3. Judgment and insight is normal.      Assessment & Plan:     32 y.o. female with the following -     Problem List Items Addressed This Visit       Palpitations     Associated with dizziness and tachycardia.  Zio patch completed in 2021, which did show 1 episode of SVT lasting 18 beats with maximum heart rate in the 160s.  No evidence of atrial fibrillation captured on event monitoring series.  Echo in October 2021 was completely normal.  Patient's thyroid studies are in normal range.  No reported underlying anxiety.  Normal heart rate today on exam with normal rhythm.  Possible trigger could be ongoing use of caffeine, which patient has discontinued since 12/4.  Encouraged abstinence from caffeine.  Patient has an upcoming appointment with cardiology in January at which point they might repeat event monitoring.  In the meantime, recommend metoprolol at a very low dose daily 12.5 mg.  Can increase dosing as needed based upon heart rate and symptoms.         Pre-diabetes     Congratulated patient on weight loss through diet and exercise.  Her A1c has decreased from 5.9-5.7.         Seborrheic keratoses     Patient has multiple lesions on her back, examined under the dermatoscope, likely consistent with seborrheic keratoses.  No evidence of melanoma.  Provided reassurance to patient.          Other Visit Diagnoses       SVT (supraventricular tachycardia) (HCC)        Relevant Medications    metoprolol SR (TOPROL XL) 25 MG TABLET SR 24 HR            No follow-ups on file.    Chel Stevenson MD   PGY-3

## 2022-12-10 NOTE — ASSESSMENT & PLAN NOTE
Congratulated patient on weight loss through diet and exercise.  Her A1c has decreased from 5.9-5.7.

## 2022-12-10 NOTE — ASSESSMENT & PLAN NOTE
Patient has multiple lesions on her back, examined under the dermatoscope, likely consistent with seborrheic keratoses.  No evidence of melanoma.  Provided reassurance to patient.

## 2022-12-10 NOTE — ASSESSMENT & PLAN NOTE
Associated with dizziness and tachycardia.  Zio patch completed in 2021, which did show 1 episode of SVT lasting 18 beats with maximum heart rate in the 160s.  No evidence of atrial fibrillation captured on event monitoring series.  Echo in October 2021 was completely normal.  Patient's thyroid studies are in normal range.  No reported underlying anxiety.  Normal heart rate today on exam with normal rhythm.  Possible trigger could be ongoing use of caffeine, which patient has discontinued since 12/4.  Encouraged abstinence from caffeine.  Patient has an upcoming appointment with cardiology in January at which point they might repeat event monitoring.  In the meantime, recommend metoprolol at a very low dose daily 12.5 mg.  Can increase dosing as needed based upon heart rate and symptoms.

## 2022-12-27 ENCOUNTER — OFFICE VISIT (OUTPATIENT)
Dept: CARDIOLOGY | Facility: MEDICAL CENTER | Age: 32
End: 2022-12-27
Payer: COMMERCIAL

## 2022-12-27 VITALS
SYSTOLIC BLOOD PRESSURE: 120 MMHG | HEIGHT: 66 IN | HEART RATE: 95 BPM | DIASTOLIC BLOOD PRESSURE: 62 MMHG | RESPIRATION RATE: 16 BRPM | WEIGHT: 201 LBS | OXYGEN SATURATION: 97 % | BODY MASS INDEX: 32.3 KG/M2

## 2022-12-27 DIAGNOSIS — I47.10 SVT (SUPRAVENTRICULAR TACHYCARDIA) (HCC): ICD-10-CM

## 2022-12-27 DIAGNOSIS — R00.2 PALPITATIONS: ICD-10-CM

## 2022-12-27 PROCEDURE — 99214 OFFICE O/P EST MOD 30 MIN: CPT | Performed by: PHYSICIAN ASSISTANT

## 2022-12-27 RX ORDER — METOPROLOL SUCCINATE 25 MG/1
25 TABLET, EXTENDED RELEASE ORAL DAILY
Qty: 90 TABLET | Refills: 3 | Status: SHIPPED | OUTPATIENT
Start: 2022-12-27 | End: 2022-12-29

## 2022-12-27 ASSESSMENT — ENCOUNTER SYMPTOMS
PND: 0
NAUSEA: 0
COUGH: 0
SHORTNESS OF BREATH: 0
CHILLS: 0
FEVER: 0
LOSS OF CONSCIOUSNESS: 0
PALPITATIONS: 1
ORTHOPNEA: 0

## 2022-12-27 ASSESSMENT — LIFESTYLE VARIABLES: SUBSTANCE_ABUSE: 0

## 2022-12-27 ASSESSMENT — FIBROSIS 4 INDEX: FIB4 SCORE: 0.43

## 2022-12-27 NOTE — PROGRESS NOTES
Chief Complaint   Patient presents with    Palpitations    Dizziness          Subjective:   Francois Quintero is a 32 y.o. female who presents today for follow-up.    Patient of Dr. Delgado, last seen February 2021.  Current medical problems include prediabetes, fatty liver disease, symptomatic PVCs and PSVT.    On 12/4/2022 she went to the emergency department for rapid heartbeat which occurred while she was in a store.  She was symptomatic and felt dizzy.  The fast heart rate lasted for 3 hours.  Her laboratory work-up was normal.Her EKG showed sinus rhythm, heart rate 95.  She was discharged from the emergency department enough to follow-up with cardiology.    She had a similar episode a year and a half ago when she called the paramedics and they treated her rapid heart rate with vagal maneuvers.    Mehul started taking metoprolol succinate 12.5 mg when she saw her primary care provider.  She is now monitoring her heart rates more closely and noticing that when she is just up walking around her house her heart rate can be in the 100s. When she sits down it quickly drops below 100. She has no symptoms with this.     She is very symptomatic with her episodes of SVT (she has only had 2). She feels palpitations, anxiety, dizziness.     She stopped drinking caffeine about 1 month ago after her ER visit.  She does not drink alcohol does not smoke cigarettes.        Past Medical History:   Diagnosis Date    Asthma          History reviewed. No pertinent surgical history.      Family History   Problem Relation Age of Onset    Cancer Mother         skin,ovarian    Cancer Paternal Uncle     Cancer Maternal Grandmother         breast         Social History     Tobacco Use   Smoking Status Never   Smokeless Tobacco Never          Social History     Substance and Sexual Activity   Alcohol Use No         No Known Allergies      Outpatient Encounter Medications as of 12/27/2022   Medication Sig Dispense Refill     "dilTIAZem (CARDIZEM) 30 MG Tab Take 1 Tablet by mouth see administration instructions. Take 1-2 pills as needed for fast heart rates/palpitations 30 Tablet 2    metoprolol SR (TOPROL XL) 25 MG TABLET SR 24 HR Take 1 Tablet by mouth every day. Take a half tablet by mouth once a day 90 Tablet 3    [DISCONTINUED] metoprolol SR (TOPROL XL) 25 MG TABLET SR 24 HR Take a half tablet by mouth once a day 30 Tablet 3     No facility-administered encounter medications on file as of 12/27/2022.         Review of Systems   Constitutional:  Negative for chills and fever.        No unexpected weight changes   Respiratory:  Negative for cough and shortness of breath.    Cardiovascular:  Positive for palpitations. Negative for chest pain, orthopnea, leg swelling and PND.   Gastrointestinal:  Negative for nausea.   Neurological:  Negative for loss of consciousness.   Psychiatric/Behavioral:  Negative for substance abuse.         Objective:   /62 (BP Location: Right arm, Patient Position: Sitting, BP Cuff Size: Adult)   Pulse 95   Resp 16   Ht 1.676 m (5' 6\")   Wt 91.2 kg (201 lb)   SpO2 97%   BMI 32.44 kg/m²        Physical Exam  Vitals and nursing note reviewed.   Constitutional:       General: She is not in acute distress.     Appearance: Normal appearance.   HENT:      Head: Normocephalic and atraumatic.   Eyes:      General: No scleral icterus.  Cardiovascular:      Rate and Rhythm: Normal rate and regular rhythm.   Pulmonary:      Effort: Pulmonary effort is normal. No respiratory distress.      Breath sounds: Normal breath sounds.   Musculoskeletal:      Right lower leg: No edema.      Left lower leg: No edema.   Skin:     General: Skin is warm and dry.      Capillary Refill: Capillary refill takes less than 2 seconds.   Neurological:      General: No focal deficit present.      Mental Status: She is alert and oriented to person, place, and time.      Gait: Gait normal.   Psychiatric:         Judgment: Judgment " normal.     Echocardiogram 10/20/2021  CONCLUSIONS  Normal transthoracic echocardiogram study.    Event monitor 5/20/2021  Procedure: zio monitor 8 days 22h   Indication: palpitations     Findings:   Underlying rhythm: Predominantly sinus rhythm with average rate 86 bpm.     Atrial events: Rare ectopy. One run of supraventricular tachycardia (SVT) lasting 18 beats at rate 118bpm, likely atrial tachycardia (AT).     Ventricular events: Rare ectopy (<1% burden) consisting of single PVCs.     Patient events: Patient triggers associated with sinus rhythm and ventricular ectopy. SVT was asymptomatic.     Impressions:   Sinus rhythm with rare, symptomatic ventricular ectopy.      Assessment:     1. SVT (supraventricular tachycardia) (HCC)  metoprolol SR (TOPROL XL) 25 MG TABLET SR 24 HR      2. Palpitations             Medical Decision Making:  Today's Assessment / Plan:   Palpitations  PSVT  - she can use Diltiazem 30-60mg and vagal maneuvers as needed for symptomatic rapid heart beats which may be from atrial tachycardia. If they are occurring more frequently then we can try to capture on an event monitor or refer to EP for rhythm control. At this point she has only had 2 episodes in the last 2 years.   - should she become pregnant we can continue Diltiazem PRN or change to metoprolol tartrate   - she is also interested in continuing the daily metoprolol at this point to help reduce the incidence, refilled today    RTC as needed.

## 2023-01-05 DIAGNOSIS — I47.10 SVT (SUPRAVENTRICULAR TACHYCARDIA) (HCC): ICD-10-CM

## 2023-01-06 RX ORDER — METOPROLOL SUCCINATE 25 MG/1
25 TABLET, EXTENDED RELEASE ORAL DAILY
Qty: 90 TABLET | Refills: 3 | OUTPATIENT
Start: 2023-01-06

## 2023-02-16 ENCOUNTER — APPOINTMENT (OUTPATIENT)
Dept: MEDICAL GROUP | Facility: CLINIC | Age: 33
End: 2023-02-16
Payer: COMMERCIAL

## 2023-03-08 ENCOUNTER — HOSPITAL ENCOUNTER (EMERGENCY)
Facility: MEDICAL CENTER | Age: 33
End: 2023-03-08
Attending: EMERGENCY MEDICINE
Payer: COMMERCIAL

## 2023-03-08 VITALS
DIASTOLIC BLOOD PRESSURE: 79 MMHG | WEIGHT: 207.45 LBS | HEART RATE: 93 BPM | OXYGEN SATURATION: 96 % | RESPIRATION RATE: 17 BRPM | HEIGHT: 66 IN | BODY MASS INDEX: 33.34 KG/M2 | TEMPERATURE: 99.1 F | SYSTOLIC BLOOD PRESSURE: 113 MMHG

## 2023-03-08 DIAGNOSIS — M54.31 SCIATICA OF RIGHT SIDE: ICD-10-CM

## 2023-03-08 PROCEDURE — 700102 HCHG RX REV CODE 250 W/ 637 OVERRIDE(OP): Performed by: EMERGENCY MEDICINE

## 2023-03-08 PROCEDURE — 99284 EMERGENCY DEPT VISIT MOD MDM: CPT

## 2023-03-08 PROCEDURE — 700111 HCHG RX REV CODE 636 W/ 250 OVERRIDE (IP): Performed by: EMERGENCY MEDICINE

## 2023-03-08 PROCEDURE — A9270 NON-COVERED ITEM OR SERVICE: HCPCS | Performed by: EMERGENCY MEDICINE

## 2023-03-08 RX ORDER — NAPROXEN 500 MG/1
500 TABLET ORAL 2 TIMES DAILY WITH MEALS
Qty: 60 TABLET | Refills: 0 | Status: SHIPPED | OUTPATIENT
Start: 2023-03-08

## 2023-03-08 RX ORDER — METHYLPREDNISOLONE 4 MG/1
TABLET ORAL
Qty: 21 EACH | Refills: 0 | Status: SHIPPED | OUTPATIENT
Start: 2023-03-08 | End: 2023-12-26

## 2023-03-08 RX ORDER — IBUPROFEN 600 MG/1
600 TABLET ORAL ONCE
Status: COMPLETED | OUTPATIENT
Start: 2023-03-08 | End: 2023-03-08

## 2023-03-08 RX ADMIN — IBUPROFEN 600 MG: 600 TABLET, FILM COATED ORAL at 20:54

## 2023-03-08 RX ADMIN — PREDNISONE 60 MG: 10 TABLET ORAL at 20:54

## 2023-03-08 ASSESSMENT — PAIN DESCRIPTION - PAIN TYPE: TYPE: CHRONIC PAIN

## 2023-03-08 ASSESSMENT — FIBROSIS 4 INDEX: FIB4 SCORE: 0.43

## 2023-03-09 ENCOUNTER — OFFICE VISIT (OUTPATIENT)
Dept: MEDICAL GROUP | Facility: CLINIC | Age: 33
End: 2023-03-09
Payer: COMMERCIAL

## 2023-03-09 DIAGNOSIS — M46.1 SI (SACROILIAC) JOINT INFLAMMATION (HCC): ICD-10-CM

## 2023-03-09 PROCEDURE — 96372 THER/PROPH/DIAG INJ SC/IM: CPT | Performed by: FAMILY MEDICINE

## 2023-03-09 PROCEDURE — 99214 OFFICE O/P EST MOD 30 MIN: CPT | Mod: 25,GC | Performed by: FAMILY MEDICINE

## 2023-03-09 RX ORDER — TRIAMCINOLONE ACETONIDE 40 MG/ML
40 INJECTION, SUSPENSION INTRA-ARTICULAR; INTRAMUSCULAR ONCE
Status: COMPLETED | OUTPATIENT
Start: 2023-03-09 | End: 2023-03-09

## 2023-03-09 RX ADMIN — TRIAMCINOLONE ACETONIDE 40 MG: 40 INJECTION, SUSPENSION INTRA-ARTICULAR; INTRAMUSCULAR at 12:08

## 2023-03-09 ASSESSMENT — FIBROSIS 4 INDEX: FIB4 SCORE: 0.43

## 2023-03-09 NOTE — ED PROVIDER NOTES
"ED Provider Note    CHIEF COMPLAINT  Chief Complaint   Patient presents with    Leg Pain     Pain Rt buttock down RLE since Dec/22  Pt has current viral illness and was dry heaving last nigh  Pain became unbearable Denies trauma       EXTERNAL RECORDS REVIEWED  Outpatient Notes show that the patient is on metoprolol for SVT    HPI/ROS  LIMITATION TO HISTORY   Select: : None  OUTSIDE HISTORIAN(S):  none    Francois Quintero is a 32 y.o. female who presents complaining of sciatic pain in her right SI joint rating down her right lower extremity since December 2022.  She denies any falls or trauma.  She denies any numbness in the groin weakness in the legs or loss of bowel or bladder control.  She states that currently she has a viral illness with runny nose and some dry heaving and this is made her pain even worse.  She has tried Tylenol for her symptoms but nothing is improving it.    PAST MEDICAL HISTORY   has a past medical history of Asthma and SVT (supraventricular tachycardia) (HCC).    SURGICAL HISTORY  patient denies any surgical history    FAMILY HISTORY  Family History   Problem Relation Age of Onset    Cancer Mother         skin,ovarian    Cancer Paternal Uncle     Cancer Maternal Grandmother         breast       SOCIAL HISTORY  Social History     Tobacco Use    Smoking status: Former     Types: Cigarettes    Smokeless tobacco: Never   Vaping Use    Vaping Use: Never used   Substance and Sexual Activity    Alcohol use: No    Drug use: No    Sexual activity: Not on file       CURRENT MEDICATIONS  Home Medications    **Home medications have not yet been reviewed for this encounter**         ALLERGIES  No Known Allergies    PHYSICAL EXAM  VITAL SIGNS: /77   Pulse (!) 117   Temp 37.4 °C (99.4 °F) (Temporal)   Resp 16   Ht 1.676 m (5' 6\")   Wt 94.1 kg (207 lb 7.3 oz)   LMP 03/06/2023   SpO2 97%   BMI 33.48 kg/m²      Constitutional: Well developed, Well nourished, No acute distress, Non-toxic " appearance.   HEENT: Normocephalic, Atraumatic,  external ears normal, pharynx pink,  Mucous  Membranes moist, No rhinorrhea or mucosal edema  Eyes: Conjunctiva normal,   Skin: Warm, Dry, No erythema, No rash,   Back:  No CVA tenderness,  No spinal tenderness, bony crepitance step offs or instability.  Positive tenderness to the right SI joint.  Extremities: Equal, intact distal pulses, No cyanosis, clubbing or edema,  No tenderness.   Musculoskeletal: Good range of motion in all major joints. No tenderness to palpation or major deformities noted.   Neurologic: Alert & oriented  Equal strength and sensation upper and lower extremities bilaterally,  No focal deficits noted.  DTRs no clonus  Psychiatric: Affect normal, Judgment normal, Mood normal.     DIAGNOSTIC STUDIES / PROCEDURES  EKG  I have independently interpreted this EKG  None    LABS  None    RADIOLOGY  I have independently interpreted the diagnostic imaging associated with this visit and am waiting the final reading from the radiologist.   My preliminary interpretation is as follows: None  Radiologist interpretation: None    COURSE & MEDICAL DECISION MAKING    ED Observation Status? No    INITIAL ASSESSMENT, COURSE AND PLAN  Care Narrative: This is a 32-year-old female complaining of right SI joint tenderness with some pain rating down the back of her right leg since December of last year.  She has tried over-the-counter pain medications and nothing is helping.  She has not had any falls or trauma.  Exam she has tenderness to her right SI joint without extremity weakness numbness or focal deficits.  At this time I believe the patient has sciatica.  I will discharge her home with anti-inflammatories and steroids.  I have referred her to Dr. Valdovinos for an SI joint block.  I also advised her to follow-up with her primary care physician for some physical therapy.  If the patient has any numbness in the groin weakness in her legs or loss of bowel or bladder  control she should go to [facility] where MRI is readily available as this could be taken of a cauda equina syndrome.  Patient will be discharged home in stable condition.        ADDITIONAL PROBLEM LIST    DISPOSITION AND DISCUSSIONS  I have discussed management of the patient with the following physicians and МАРИНА's:  none    Discussion of management with other [facility] or appropriate source(s): None     Escalation of care considered, and ultimately not performed:none    Barriers to care at this time, including but not limited to: none.     Decision tools and prescription drugs considered including, but not limited to: Pain Medications medrol and naprosyn .    The patient will return for new or worsening symptoms and is stable at the time of discharge.    The patient is referred to a primary physician for blood pressure management, diabetic screening, and for all other preventative health concerns.      DISPOSITION:  Patient will be discharged home in stable condition.    FOLLOW UP:  Geraldo Valdovinos M.D.  [address] [facility] [place] [address]  [phone]    In 1 day  to establish care      OUTPATIENT MEDICATIONS:  Discharge Medication List as of 3/8/2023  9:04 PM        START taking these medications    Details   methylPREDNISolone (MEDROL DOSEPAK) 4 MG Tablet Therapy Pack Take as directed, Disp-21 Each, R-0, Normal      naproxen (NAPROSYN) 500 MG Tab Take 1 Tablet by mouth 2 times a day with meals., Disp-60 Tablet, R-0, Normal               FINAL DIAGNOSIS  1. Sciatica of right side           Electronically signed by: Lili Haynes M.D., 3/8/2023 8:32 PM

## 2023-03-09 NOTE — ED NOTES
Pt. Verbalizes understanding of discharge instructions. . Pt. Alert/awake in NAD.   All questions answered and understood.

## 2023-03-10 PROBLEM — M46.1 SI (SACROILIAC) JOINT INFLAMMATION (HCC): Status: ACTIVE | Noted: 2023-03-10

## 2023-03-10 NOTE — ASSESSMENT & PLAN NOTE
Acute on chronic, worsening  New problem to this provider  Seems to be less of a sciatica type issue and more of an SI joint dysfunction.  Discussed risk/benefits/alternatives to SI joint injection, and patient opted to go forward with this.  Written consent signed and placed in the chart.    Right SI joint identified and isolated.  Cleaned with ChloraPrep in the normal sterile fashion.  1 cc of 2% lidocaine without epinephrine and 0.5 cc of 40 mg Kenalog concoction arranged.  3-1/2 cm spinal needle, 22-gauge used.  Patient tolerated the procedure well without complication.  Discussed return precautions.    Referral still placed for physical medicine & rehabilitation-patient was on the impression should be getting an epidural spinal injection, and would like to have a referral in place if possible

## 2023-03-10 NOTE — PROGRESS NOTES
Subjective:   Francois Quintero is a 32 y.o. female here for the evaluation and management of Injections and Low Back Pain    Problem   Si (Sacroiliac) Joint Inflammation (Hcc)    32-year-old female presenting with acute on chronic right leg pain. Patient was seen in the ED on 3/8 for acute exacerbation of the pain down her leg.  States been present since December 2022 and nothing is seem to help it.  She was reaching for something to the side and felt an instant pain.  In the ED, no x-rays were obtained.  Patient was referred by the ED to see a PM&R doctor on 3/9, but  her insurance did not cover it and she was then referred to a sports medicine provider in the clinic here.  Pain seems to be in the left buttock.  Denies much tingling/numbness down her leg.  Nothing specifically in the foot, but walking has been difficult, as has movement in general.  Would like an injection if possible.         ROS    Current Outpatient Medications   Medication Sig Dispense Refill    methylPREDNISolone (MEDROL DOSEPAK) 4 MG Tablet Therapy Pack Take as directed 21 Each 0    naproxen (NAPROSYN) 500 MG Tab Take 1 Tablet by mouth 2 times a day with meals. 60 Tablet 0    metoprolol SR (TOPROL XL) 25 MG TABLET SR 24 HR Take 1 Tablet by mouth every day. 90 Tablet 3    dilTIAZem (CARDIZEM) 30 MG Tab Take 1 Tablet by mouth see administration instructions. Take 1-2 pills as needed for fast heart rates/palpitations 30 Tablet 2     No current facility-administered medications for this visit.       Allergies  Patient has no known allergies.    Past Medical History:   Diagnosis Date    Asthma     SVT (supraventricular tachycardia) ([facility])        Patient Active Problem List    Diagnosis Date Noted    SI (sacroiliac) joint inflammation (HCC) 03/10/2023    Seborrheic keratoses 12/09/2022    Vitreous floaters of both eyes 08/18/2022    Pre-diabetes 08/18/2022    Neuropathy 06/06/2019    Right-sided chest wall pain 06/06/2019    Deviated septum  "06/06/2019    Postural dizziness 04/10/2019    Palpitations 06/06/2018    Routine health maintenance 05/23/2018    Right lower quadrant abdominal pain 05/23/2018    Beta thalassemia trait 08/04/2014       Past Surgical History  No past surgical history on file.    Social History     Socioeconomic History    Marital status: Single   Tobacco Use    Smoking status: Former     Types: Cigarettes    Smokeless tobacco: Never   Vaping Use    Vaping Use: Never used   Substance and Sexual Activity    Alcohol use: No    Drug use: No        Objective:     Vitals:    03/09/23 1037   BP: (P) 122/72   BP Location: (P) Right arm   Patient Position: (P) Sitting   BP Cuff Size: (P) Adult   Pulse: (!) (P) 105   Temp: (P) 36.7 °C (98.1 °F)   TempSrc: (P) Temporal   SpO2: (P) 95%   Weight: (P) 93.9 kg (207 lb)   Height: (P) 1.689 m (5' 6.5\")     Body mass index is 32.91 kg/m² (pended).     Physical Exam  Gen:  AO, NAD  MSK:    --Right SI joint tender to palpation diffusely in the muscle distribution around them    Assessment and Plan:   Francois Quintero is a 32 y.o. female with a Injections and Low Back Pain     The following was discussed with the patient today.    Problem List Items Addressed This Visit       SI (sacroiliac) joint inflammation (HCC)     Acute on chronic, worsening  New problem to this provider  Seems to be less of a sciatica type issue and more of an SI joint dysfunction.  Discussed risk/benefits/alternatives to SI joint injection, and patient opted to go forward with this.  Written consent signed and placed in the chart.    Right SI joint identified and isolated.  Cleaned with ChloraPrep in the normal sterile fashion.  1 cc of 2% lidocaine without epinephrine and 0.5 cc of 40 mg Kenalog concoction arranged.  3-1/2 cm spinal needle, 22-gauge used.  Patient tolerated the procedure well without complication.  Discussed return precautions.    Referral still placed for physical medicine & rehabilitation-patient " was on the impression should be getting an epidural spinal injection, and would like to have a referral in place if possible         Relevant Orders    Referral to Pain Clinic       Followup: No follow-ups on file.    Ulysses Lee M.D.    Patient seen by and discussed with the attending, Dr. Leander MD.    Please note that this dictation was created using voice recognition software. I have made every reasonable attempt to correct obvious errors, but I expect that there are errors of grammar and possibly content that I did not discover before finalizing the note.

## 2023-03-13 NOTE — TELEPHONE ENCOUNTER
Is the patient due for a refill? Yes    Was the patient seen the past year? Yes    Date of last office visit: 12/27/2022    Does the patient have an upcoming appointment?  No   If yes, When?     Provider to refill:CLINTON    Does the patients insurance require a 100 day supply?  No

## 2023-04-11 DIAGNOSIS — R00.2 PALPITATIONS: ICD-10-CM

## 2023-04-11 NOTE — TELEPHONE ENCOUNTER
Is the patient due for a refill? No, patient requesting 90 day supply    Was the patient seen the past year? Yes    Date of last office visit: 12/27/22    Does the patient have an upcoming appointment?  No    Provider to refill:CLINTON    Does the patients insurance require a 100 day supply?  No

## 2023-06-01 ENCOUNTER — TELEPHONE (OUTPATIENT)
Dept: SCHEDULING | Facility: IMAGING CENTER | Age: 33
End: 2023-06-01
Payer: COMMERCIAL

## 2023-12-26 ENCOUNTER — OFFICE VISIT (OUTPATIENT)
Dept: URGENT CARE | Facility: PHYSICIAN GROUP | Age: 33
End: 2023-12-26
Payer: COMMERCIAL

## 2023-12-26 VITALS
OXYGEN SATURATION: 98 % | HEIGHT: 66 IN | BODY MASS INDEX: 34.23 KG/M2 | RESPIRATION RATE: 16 BRPM | HEART RATE: 98 BPM | TEMPERATURE: 97.2 F | WEIGHT: 213 LBS | SYSTOLIC BLOOD PRESSURE: 100 MMHG | DIASTOLIC BLOOD PRESSURE: 70 MMHG

## 2023-12-26 DIAGNOSIS — J02.9 PHARYNGITIS, UNSPECIFIED ETIOLOGY: ICD-10-CM

## 2023-12-26 LAB — S PYO DNA SPEC NAA+PROBE: NOT DETECTED

## 2023-12-26 PROCEDURE — 87651 STREP A DNA AMP PROBE: CPT | Performed by: PHYSICIAN ASSISTANT

## 2023-12-26 PROCEDURE — 3078F DIAST BP <80 MM HG: CPT | Performed by: PHYSICIAN ASSISTANT

## 2023-12-26 PROCEDURE — 3074F SYST BP LT 130 MM HG: CPT | Performed by: PHYSICIAN ASSISTANT

## 2023-12-26 PROCEDURE — 99213 OFFICE O/P EST LOW 20 MIN: CPT | Performed by: PHYSICIAN ASSISTANT

## 2023-12-26 ASSESSMENT — ENCOUNTER SYMPTOMS
EYE REDNESS: 0
COUGH: 0
ABDOMINAL PAIN: 0
EYE DISCHARGE: 0
CONSTIPATION: 0
HEADACHES: 0
WHEEZING: 0
NAUSEA: 0
FEVER: 0
SORE THROAT: 1
SHORTNESS OF BREATH: 0
CHILLS: 0
DIARRHEA: 0
DIZZINESS: 0
VOMITING: 0
SINUS PAIN: 0
EYE PAIN: 0
DIAPHORESIS: 0

## 2023-12-26 ASSESSMENT — FIBROSIS 4 INDEX: FIB4 SCORE: 0.45

## 2023-12-26 NOTE — PROGRESS NOTES
"  Subjective:     Francois Quintero  is a 33 y.o. female who presents for Nasal Congestion (With sore throat x last night.)       She does present today with concern of possible strep infection due to known exposure to a child who did test positive for strep 2 days ago.  She states that the child does sleep in bed with her at night.  She is having sinus congestion as well as a sore throat.  Having pain with swallowing, denies dysphagia.  Denies fever/chills/sweats, chest pain, shortness of breath, nausea/vomiting, abdominal pain, diarrhea.         Review of Systems   Constitutional:  Negative for chills, diaphoresis, fever and malaise/fatigue.   HENT:  Positive for congestion and sore throat. Negative for ear discharge and sinus pain.    Eyes:  Negative for pain, discharge and redness.   Respiratory:  Negative for cough, shortness of breath and wheezing.    Cardiovascular:  Negative for chest pain.   Gastrointestinal:  Negative for abdominal pain, constipation, diarrhea, nausea and vomiting.   Neurological:  Negative for dizziness and headaches.      No Known Allergies  Past Medical History:   Diagnosis Date    Asthma     SVT (supraventricular tachycardia)         Objective:   /70 (BP Location: Left arm, Patient Position: Sitting, BP Cuff Size: Adult long)   Pulse 98   Temp 36.2 °C (97.2 °F) (Temporal)   Resp 16   Ht 1.676 m (5' 6\")   Wt 96.6 kg (213 lb)   SpO2 98%   BMI 34.38 kg/m²   Physical Exam  Vitals and nursing note reviewed.   Constitutional:       General: She is not in acute distress.     Appearance: Normal appearance. She is not ill-appearing, toxic-appearing or diaphoretic.   HENT:      Head: Normocephalic.      Right Ear: Tympanic membrane, ear canal and external ear normal. There is no impacted cerumen.      Left Ear: Tympanic membrane, ear canal and external ear normal. There is no impacted cerumen.      Nose: No congestion or rhinorrhea.      Mouth/Throat:      Mouth: Mucous " membranes are moist.      Pharynx: Posterior oropharyngeal erythema present. No oropharyngeal exudate.      Comments: No tonsillar swelling, bilaterally.  No soft tissue swelling of the sublingual mucosa, no swelling of the soft or hard palate, no unilarteral oral pharynx swelling, no uvular deviation.  Eyes:      General:         Right eye: No discharge.         Left eye: No discharge.      Conjunctiva/sclera: Conjunctivae normal.   Cardiovascular:      Rate and Rhythm: Normal rate and regular rhythm.   Pulmonary:      Effort: Pulmonary effort is normal. No respiratory distress.      Breath sounds: Normal breath sounds. No stridor. No wheezing or rhonchi.   Musculoskeletal:      Cervical back: Neck supple.   Lymphadenopathy:      Cervical: No cervical adenopathy.   Neurological:      General: No focal deficit present.      Mental Status: She is alert and oriented to person, place, and time.   Psychiatric:         Mood and Affect: Mood normal.         Behavior: Behavior normal.         Thought Content: Thought content normal.         Judgment: Judgment normal.             Diagnostic testing:    Cephid Strep -negative, notified via phone call    Assessment/Plan:     Encounter Diagnoses   Name Primary?    Pharyngitis, unspecified etiology           Plan for care for today's complaint includes using over-the-counter medications for symptom support as symptoms are likely viral in nature.  Strep test was negative today.  Continue to monitor symptoms and return to urgent care or follow-up with primary care provider if symptoms remain ongoing.  Follow-up in the emergency department if symptoms become severe, ER precautions discussed in office today..    See AVS Instructions below for written guidance provided to patient on after-visit management and care in addition to our verbal discussion during the visit.    Please note that this dictation was created using voice recognition software. I have attempted to correct all  errors, but there may be sound-alike, spelling, grammar and possibly content errors that I did not discover before finalizing the note.    Woolrich Janett GUAJARDO

## 2024-02-09 ENCOUNTER — APPOINTMENT (OUTPATIENT)
Dept: MEDICAL GROUP | Facility: CLINIC | Age: 34
End: 2024-02-09
Payer: COMMERCIAL

## 2024-02-13 ENCOUNTER — TELEPHONE (OUTPATIENT)
Dept: MEDICAL GROUP | Facility: CLINIC | Age: 34
End: 2024-02-13
Payer: COMMERCIAL

## 2024-02-15 ENCOUNTER — OFFICE VISIT (OUTPATIENT)
Dept: MEDICAL GROUP | Facility: CLINIC | Age: 34
End: 2024-02-15
Payer: COMMERCIAL

## 2024-02-15 VITALS
HEIGHT: 67 IN | TEMPERATURE: 98.5 F | OXYGEN SATURATION: 98 % | WEIGHT: 210 LBS | HEART RATE: 110 BPM | SYSTOLIC BLOOD PRESSURE: 126 MMHG | DIASTOLIC BLOOD PRESSURE: 95 MMHG | BODY MASS INDEX: 32.96 KG/M2

## 2024-02-15 DIAGNOSIS — Z87.898 HISTORY OF PREDIABETES: ICD-10-CM

## 2024-02-15 DIAGNOSIS — H69.92 EUSTACHIAN TUBE DYSFUNCTION, LEFT: ICD-10-CM

## 2024-02-15 LAB
HBA1C MFR BLD: 5.6 % (ref ?–5.8)
POCT INT CON NEG: NEGATIVE
POCT INT CON POS: POSITIVE

## 2024-02-15 PROCEDURE — 3080F DIAST BP >= 90 MM HG: CPT | Performed by: STUDENT IN AN ORGANIZED HEALTH CARE EDUCATION/TRAINING PROGRAM

## 2024-02-15 PROCEDURE — 83036 HEMOGLOBIN GLYCOSYLATED A1C: CPT | Performed by: STUDENT IN AN ORGANIZED HEALTH CARE EDUCATION/TRAINING PROGRAM

## 2024-02-15 PROCEDURE — 3074F SYST BP LT 130 MM HG: CPT | Performed by: STUDENT IN AN ORGANIZED HEALTH CARE EDUCATION/TRAINING PROGRAM

## 2024-02-15 PROCEDURE — 99214 OFFICE O/P EST MOD 30 MIN: CPT | Performed by: STUDENT IN AN ORGANIZED HEALTH CARE EDUCATION/TRAINING PROGRAM

## 2024-02-15 RX ORDER — FLUTICASONE PROPIONATE 50 MCG
1 SPRAY, SUSPENSION (ML) NASAL DAILY
Qty: 16 G | Refills: 2 | Status: SHIPPED | OUTPATIENT
Start: 2024-02-15 | End: 2024-03-11

## 2024-02-15 ASSESSMENT — FIBROSIS 4 INDEX: FIB4 SCORE: 0.45

## 2024-02-16 PROBLEM — H69.92 EUSTACHIAN TUBE DYSFUNCTION, LEFT: Status: ACTIVE | Noted: 2024-02-16

## 2024-02-17 NOTE — PROGRESS NOTES
Subjective:   Francois Quintero is a 33 y.o. female here for the evaluation and management of Otalgia (Pressure and hear a wind sound/) and Diabetes (Would like A1c check )    HPI    Pleasant 33 year old female here for evaluation of left ear fullness.     #Ear  Left ear has felt plugged for the past month. Ear feels like it needs to pop, but has not. Denies any pain or drainage. Has not tried anything for this. Has never happened before. Denies any URI symptoms prior to onset of symptoms or currently. Has not traveled recently. No changes in elevation. Denies any recent allergies, runny nose, sinus congestion, coughing, or globus sensation. Denies hearing loss, constant ringing in ears, or dizziness.     #Labs  Patient has hx of prediabetes, and is requesting repeat Hgb A1c today. Has upcoming wedding in August, and has been working on diet and exercise. Has also mentioned recent new allergy to eggs. Reports younger sister recently developed new allergies recently, and was diagnosed with Hashimoto thyroiditis, and is beginning treatment with new medication. Patient is concerned about possible development of hypothyroidism as well, and would like to have labs checked. Denies any cold or heat intolerance, recent weight changes, depressed mood or abnormal energy levels, or constipation. Thinks she may have some hair thinning.     ROS  As per HPI     Current Outpatient Medications   Medication Sig Dispense Refill    fluticasone (FLONASE) 50 MCG/ACT nasal spray Administer 1 Spray into affected nostril(S) every day. 16 g 2    dilTIAZem (CARDIZEM) 30 MG Tab TAKE 1-2 TABLETS BY MOUTH AS NEEDED FOR FAST HEART RATES/PALPITATIONS 90 Tablet 2    naproxen (NAPROSYN) 500 MG Tab Take 1 Tablet by mouth 2 times a day with meals. 60 Tablet 0    metoprolol SR (TOPROL XL) 25 MG TABLET SR 24 HR Take 1 Tablet by mouth every day. 90 Tablet 3     No current facility-administered medications for this visit.     Allergies  Patient  "has no known allergies.    Past Medical History:   Diagnosis Date    Asthma     SVT (supraventricular tachycardia)      Patient Active Problem List    Diagnosis Date Noted    Eustachian tube dysfunction, left 02/16/2024    SI (sacroiliac) joint inflammation (HCC) 03/10/2023    Seborrheic keratoses 12/09/2022    Vitreous floaters of both eyes 08/18/2022    Pre-diabetes 08/18/2022    Neuropathy 06/06/2019    Right-sided chest wall pain 06/06/2019    Deviated septum 06/06/2019    Postural dizziness 04/10/2019    Palpitations 06/06/2018    Routine health maintenance 05/23/2018    Right lower quadrant abdominal pain 05/23/2018    Beta thalassemia trait 08/04/2014       Past Surgical History  No past surgical history on file.    Social History     Socioeconomic History    Marital status: Single     Spouse name: Not on file    Number of children: Not on file    Years of education: Not on file    Highest education level: Not on file   Occupational History    Not on file   Tobacco Use    Smoking status: Former     Types: Cigarettes    Smokeless tobacco: Never   Vaping Use    Vaping Use: Never used   Substance and Sexual Activity    Alcohol use: No    Drug use: No    Sexual activity: Not on file   Other Topics Concern    Not on file   Social History Narrative    Not on file     Social Determinants of Health     Financial Resource Strain: Not on file   Food Insecurity: Not on file   Transportation Needs: Not on file   Physical Activity: Not on file   Stress: Not on file   Social Connections: Not on file   Intimate Partner Violence: Not on file   Housing Stability: Not on file        Objective:     Vitals:    02/15/24 1052   BP: (!) 126/95   BP Location: Left arm   Patient Position: Sitting   BP Cuff Size: Large adult   Pulse: (!) 110   Temp: 36.9 °C (98.5 °F)   TempSrc: Temporal   SpO2: 98%   Weight: 95.3 kg (210 lb)   Height: 1.689 m (5' 6.5\")     Body mass index is 33.39 kg/m².     Physical Exam  General: Well appearing, no " acute distress  HEENT: Tms clear with serious effusion bilaterally. No erythema or bulging. Hearing grossly intact bilaterally. Deviated septum is present, chronic. No tenderness over sinuses bilaterally. Scant postnasal drip in posterior oropharynx. No cervical lymphadenopathy. No thyromegaly.   Cardiac: RRR, no murmurs, gallops, or rubs  Lungs: CTAB    Assessment and Plan:   Francois Quintero is a 33 y.o. female with a Otalgia (Pressure and hear a wind sound/) and Diabetes (Would like A1c check )     The following was discussed with the patient today.    1. BMI 31.0-31.9,adult  - Comp Metabolic Panel; Future  - TSH WITH REFLEX TO FT4; Future  - Lipid Profile; Future    2. History of prediabetes  - POCT A1C    3. Eustachian tube dysfunction, left  - fluticasone (FLONASE) 50 MCG/ACT nasal spray; Administer 1 Spray into affected nostril(S) every day.  Dispense: 16 g; Refill: 2    Problem List Items Addressed This Visit       Eustachian tube dysfunction, left     No concerning findings on exam or in history. Patient is noted to be constantly clearing throat, and does have scant postnasal drip noted on exam. Discussed that likely etiology of ear fullness is eustachian tube dysfunction. Provided Rx for flonase and advised patient  OTC Allegra-D for symptomatic relief. Follow up in 2-4 weeks if symptoms persist or worsen.          Relevant Medications    fluticasone (FLONASE) 50 MCG/ACT nasal spray     Other Visit Diagnoses       BMI 31.0-31.9,adult        Ordering Screening labs and TSH at request of patient given sister's recent hypothyroid diagnosis.    Relevant Orders    Comp Metabolic Panel    TSH WITH REFLEX TO FT4    Lipid Profile    History of prediabetes        A1c obtained today, 5.6. Continue with diet and lifestyle modification.    Relevant Orders    POCT A1C (Completed)          Scottie Rolle DO  [employer] [facility] Fellow

## 2024-02-17 NOTE — ASSESSMENT & PLAN NOTE
No concerning findings on exam or in history. Patient is noted to be constantly clearing throat, and does have scant postnasal drip noted on exam. Discussed that likely etiology of ear fullness is eustachian tube dysfunction. Provided Rx for flonase and advised patient  OTC Allegra-D for symptomatic relief. Follow up in 2-4 weeks if symptoms persist or worsen.

## 2024-02-21 ENCOUNTER — APPOINTMENT (OUTPATIENT)
Dept: RADIOLOGY | Facility: MEDICAL CENTER | Age: 34
End: 2024-02-21
Attending: EMERGENCY MEDICINE
Payer: COMMERCIAL

## 2024-02-21 ENCOUNTER — HOSPITAL ENCOUNTER (EMERGENCY)
Facility: MEDICAL CENTER | Age: 34
End: 2024-02-21
Attending: EMERGENCY MEDICINE
Payer: COMMERCIAL

## 2024-02-21 VITALS
HEIGHT: 67 IN | RESPIRATION RATE: 16 BRPM | SYSTOLIC BLOOD PRESSURE: 123 MMHG | BODY MASS INDEX: 34.08 KG/M2 | OXYGEN SATURATION: 95 % | HEART RATE: 80 BPM | TEMPERATURE: 99 F | DIASTOLIC BLOOD PRESSURE: 72 MMHG | WEIGHT: 217.15 LBS

## 2024-02-21 DIAGNOSIS — H93.12 TINNITUS OF LEFT EAR: ICD-10-CM

## 2024-02-21 LAB
ANION GAP SERPL CALC-SCNC: 13 MMOL/L (ref 7–16)
BASOPHILS # BLD AUTO: 1 % (ref 0–1.8)
BASOPHILS # BLD: 0.09 K/UL (ref 0–0.12)
BUN SERPL-MCNC: 12 MG/DL (ref 8–22)
CALCIUM SERPL-MCNC: 9.3 MG/DL (ref 8.4–10.2)
CHLORIDE SERPL-SCNC: 100 MMOL/L (ref 96–112)
CO2 SERPL-SCNC: 26 MMOL/L (ref 20–33)
CREAT SERPL-MCNC: 0.99 MG/DL (ref 0.5–1.4)
EOSINOPHIL # BLD AUTO: 0.42 K/UL (ref 0–0.51)
EOSINOPHIL NFR BLD: 4.8 % (ref 0–6.9)
ERYTHROCYTE [DISTWIDTH] IN BLOOD BY AUTOMATED COUNT: 32.1 FL (ref 35.9–50)
ERYTHROCYTE [SEDIMENTATION RATE] IN BLOOD BY WESTERGREN METHOD: 0 MM/HOUR (ref 0–25)
GFR SERPLBLD CREATININE-BSD FMLA CKD-EPI: 77 ML/MIN/1.73 M 2
GLUCOSE SERPL-MCNC: 109 MG/DL (ref 65–99)
HCG SERPL QL: NEGATIVE
HCT VFR BLD AUTO: 43.2 % (ref 37–47)
HGB BLD-MCNC: 13.7 G/DL (ref 12–16)
IMM GRANULOCYTES # BLD AUTO: 0.02 K/UL (ref 0–0.11)
IMM GRANULOCYTES NFR BLD AUTO: 0.2 % (ref 0–0.9)
LYMPHOCYTES # BLD AUTO: 2.99 K/UL (ref 1–4.8)
LYMPHOCYTES NFR BLD: 34.3 % (ref 22–41)
MCH RBC QN AUTO: 20.7 PG (ref 27–33)
MCHC RBC AUTO-ENTMCNC: 31.7 G/DL (ref 32.2–35.5)
MCV RBC AUTO: 65.3 FL (ref 81.4–97.8)
MONOCYTES # BLD AUTO: 0.61 K/UL (ref 0–0.85)
MONOCYTES NFR BLD AUTO: 7 % (ref 0–13.4)
NEUTROPHILS # BLD AUTO: 4.58 K/UL (ref 1.82–7.42)
NEUTROPHILS NFR BLD: 52.7 % (ref 44–72)
NRBC # BLD AUTO: 0 K/UL
NRBC BLD-RTO: 0 /100 WBC (ref 0–0.2)
PLATELET # BLD AUTO: 361 K/UL (ref 164–446)
PMV BLD AUTO: 9.8 FL (ref 9–12.9)
POTASSIUM SERPL-SCNC: 3.6 MMOL/L (ref 3.6–5.5)
RBC # BLD AUTO: 6.62 M/UL (ref 4.2–5.4)
SODIUM SERPL-SCNC: 139 MMOL/L (ref 135–145)
WBC # BLD AUTO: 8.7 K/UL (ref 4.8–10.8)

## 2024-02-21 PROCEDURE — 700117 HCHG RX CONTRAST REV CODE 255: Performed by: EMERGENCY MEDICINE

## 2024-02-21 PROCEDURE — 99283 EMERGENCY DEPT VISIT LOW MDM: CPT

## 2024-02-21 PROCEDURE — 84703 CHORIONIC GONADOTROPIN ASSAY: CPT

## 2024-02-21 PROCEDURE — 85652 RBC SED RATE AUTOMATED: CPT

## 2024-02-21 PROCEDURE — 70496 CT ANGIOGRAPHY HEAD: CPT

## 2024-02-21 PROCEDURE — 80048 BASIC METABOLIC PNL TOTAL CA: CPT

## 2024-02-21 PROCEDURE — 36415 COLL VENOUS BLD VENIPUNCTURE: CPT

## 2024-02-21 PROCEDURE — 85025 COMPLETE CBC W/AUTO DIFF WBC: CPT

## 2024-02-21 RX ORDER — LORAZEPAM 0.5 MG/1
0.5 TABLET ORAL ONCE
Status: DISCONTINUED | OUTPATIENT
Start: 2024-02-21 | End: 2024-02-21

## 2024-02-21 RX ORDER — PREDNISONE 20 MG/1
20 TABLET ORAL DAILY
Qty: 4 TABLET | Refills: 0 | Status: SHIPPED | OUTPATIENT
Start: 2024-02-21 | End: 2024-02-25

## 2024-02-21 RX ADMIN — IOHEXOL 80 ML: 350 INJECTION, SOLUTION INTRAVENOUS at 20:41

## 2024-02-21 ASSESSMENT — FIBROSIS 4 INDEX: FIB4 SCORE: 0.45

## 2024-02-22 NOTE — ED TRIAGE NOTES
"Chief Complaint   Patient presents with    Ear Pain     L ear \"pressure and whooshing\"    Headache     \"In the front of my head\"    Tingling     Bilat legs     Physical Exam  Pulmonary:      Effort: Pulmonary effort is normal.   Skin:     General: Skin is warm and dry.   Neurological:      Mental Status: She is alert.         "

## 2024-02-22 NOTE — ED PROVIDER NOTES
"ED Provider Note    CHIEF COMPLAINT  Chief Complaint   Patient presents with    Ear Pain     L ear \"pressure and whooshing\"    Headache     \"In the front of my head\"    Tingling     Bilat legs       EXTERNAL RECORDS REVIEWED  Outpatient Notes most recent outpatient office visit 2/15/2024 when she was followed up for obesity.  At the end of last year in December she was seen for pharyngitis.    HPI/ROS  LIMITATION TO HISTORY   Select: : None  OUTSIDE HISTORIAN(S):  None    Marsukhwinder Ivett Quintero is a 33 y.o. female who presents to the emergency department complaining of persistent left ear discomfort with whooshing like sound in her left ear.  Notes mild headache/discomfort around her left ear.  This is now been ongoing for weeks.  She was seen by another provider earlier which prescribed her Flonase and antihistamine in addition to earwax cleaning kit.  She states that none of this has helped.  Now here for further concern about possible stroke as she states that her mother has previously had a stroke.    PAST MEDICAL HISTORY   has a past medical history of Asthma, SVT (supraventricular tachycardia), and Thalassemia.    SURGICAL HISTORY  patient denies any surgical history    FAMILY HISTORY  Family History   Problem Relation Age of Onset    Cancer Mother         skin,ovarian    Cancer Paternal Uncle     Cancer Maternal Grandmother         breast       SOCIAL HISTORY  Social History     Tobacco Use    Smoking status: Former     Types: Cigarettes    Smokeless tobacco: Never   Vaping Use    Vaping Use: Never used   Substance and Sexual Activity    Alcohol use: No    Drug use: No    Sexual activity: Not on file       CURRENT MEDICATIONS  Home Medications       Reviewed by Ann Cash R.N. (Registered Nurse) on 02/21/24 at 1810  Med List Status: Not Addressed     Medication Last Dose Status   dilTIAZem (CARDIZEM) 30 MG Tab  Active   fluticasone (FLONASE) 50 MCG/ACT nasal spray  Active   metoprolol SR (TOPROL XL) " "25 MG TABLET SR 24 HR  Active   naproxen (NAPROSYN) 500 MG Tab  Active                    ALLERGIES  No Known Allergies    PHYSICAL EXAM  VITAL SIGNS: /72   Pulse 80   Temp 37.2 °C (99 °F) (Temporal)   Resp 16   Ht 1.702 m (5' 7\")   Wt 98.5 kg (217 lb 2.5 oz)   LMP 02/13/2024 (Approximate)   SpO2 95%   BMI 34.01 kg/m²      Pulse ox interpretation: I interpret this pulse ox as normal.  Constitutional: Alert in no apparent distress.  HENT: No signs of trauma,  Nose normal.  Bilateral external canals and TMs are well-appearing.  No mastoid tenderness.  No temporal artery tenderness.  Eyes: Pupils are equal and reactive   Neck: Normal range of motion, No tenderness, Supple  Cardiovascular: Regular rate and rhythm, no murmurs.   Thorax & Lungs: Normal breath sounds, No respiratory distress, No wheezing, No chest tenderness.   Abdomen: Bowel sounds normal, Soft, No tenderness  Skin: Warm, Dry, No erythema, No rash.   Musculoskeletal: Good range of motion in all major joints. No tenderness to palpation or major deformities noted.   Neurologic: Alert , Normal motor function, Normal sensory function, No focal deficits noted.   Psychiatric: Affect normal, Judgment normal, Mood normal.         DIAGNOSTIC STUDIES / PROCEDURES    LABS  Results for orders placed or performed during the hospital encounter of 02/21/24   CBC WITH DIFFERENTIAL   Result Value Ref Range    WBC 8.7 4.8 - 10.8 K/uL    RBC 6.62 (H) 4.20 - 5.40 M/uL    Hemoglobin 13.7 12.0 - 16.0 g/dL    Hematocrit 43.2 37.0 - 47.0 %    MCV 65.3 (L) 81.4 - 97.8 fL    MCH 20.7 (L) 27.0 - 33.0 pg    MCHC 31.7 (L) 32.2 - 35.5 g/dL    RDW 32.1 (L) 35.9 - 50.0 fL    Platelet Count 361 164 - 446 K/uL    MPV 9.8 9.0 - 12.9 fL    Neutrophils-Polys 52.70 44.00 - 72.00 %    Lymphocytes 34.30 22.00 - 41.00 %    Monocytes 7.00 0.00 - 13.40 %    Eosinophils 4.80 0.00 - 6.90 %    Basophils 1.00 0.00 - 1.80 %    Immature Granulocytes 0.20 0.00 - 0.90 %    Nucleated RBC 0.00 " 0.00 - 0.20 /100 WBC    Neutrophils (Absolute) 4.58 1.82 - 7.42 K/uL    Lymphs (Absolute) 2.99 1.00 - 4.80 K/uL    Monos (Absolute) 0.61 0.00 - 0.85 K/uL    Eos (Absolute) 0.42 0.00 - 0.51 K/uL    Baso (Absolute) 0.09 0.00 - 0.12 K/uL    Immature Granulocytes (abs) 0.02 0.00 - 0.11 K/uL    NRBC (Absolute) 0.00 K/uL   BASIC METABOLIC PANEL   Result Value Ref Range    Sodium 139 135 - 145 mmol/L    Potassium 3.6 3.6 - 5.5 mmol/L    Chloride 100 96 - 112 mmol/L    Co2 26 20 - 33 mmol/L    Glucose 109 (H) 65 - 99 mg/dL    Bun 12 8 - 22 mg/dL    Creatinine 0.99 0.50 - 1.40 mg/dL    Calcium 9.3 8.4 - 10.2 mg/dL    Anion Gap 13.0 7.0 - 16.0   Sed Rate   Result Value Ref Range    Sed Rate Westergren 0 0 - 25 mm/hour   HCG QUAL SERUM   Result Value Ref Range    Beta-Hcg Qualitative Serum Negative Negative   ESTIMATED GFR   Result Value Ref Range    GFR (CKD-EPI) 77 >60 mL/min/1.73 m 2     ]    RADIOLOGY  I have independently interpreted the diagnostic imaging associated with this visit and am waiting the final reading from the radiologist.   My preliminary interpretation is as follows: No obvious mass, bleed or aneurysm  Radiologist interpretation:   CT-CTA HEAD WITH & W/O-POST PROCESS   Final Result      CT angiogram of the [place] within normal limits.            COURSE & MEDICAL DECISION MAKING    ED Observation Status? No; Patient does not meet criteria for ED Observation.     INITIAL ASSESSMENT, COURSE AND PLAN  Care Narrative: 33-year-old female presenting to the emergency department with above presentation.  Will proceed with hematologic evaluation, inflammatory markers and imaging as discussed with patient.    DISPOSITION AND DISCUSSIONS  I have discussed management of the patient with the following physicians and МАРИНА's: None    Discussion of management with other QHP or appropriate source(s): None     Escalation of care considered, and ultimately not performed:acute inpatient care management, however at  this time, the patient is most appropriate for outpatient management    Barriers to care at this time, including but not limited to:  None .     33-year-old female presenting to the emerged part with above presentation.  Workup is benign.  Inflammatory markers are negative.  CT imaging negative.  At this point I do not have a definitive diagnosis or etiology of the patient's current symptoms.  I will refer her back to her PCP as well as outpatient ENT.  She has previously been seen by ENT which she will primarily try to recontact however new ENT referral also provided here today.  In the meantime she is understanding of return precautions here to the ER if needed.        FINAL DIAGNOSIS  1. Tinnitus of left ear           Electronically signed by: Gareth Briones M.D., 2/21/2024 7:27 PM

## 2024-02-23 DIAGNOSIS — H93.12 TINNITUS OF LEFT EAR: ICD-10-CM

## 2024-02-23 NOTE — PROGRESS NOTES
"Persistent tinnitus, fullness, and \"whooshing\" sensation in left ear. Neg head CT w/ angiogram. Ordering MRI for further workup.     Scottie Rolle, DO  [employer] [facility] Fellow     "

## 2024-03-04 ENCOUNTER — APPOINTMENT (OUTPATIENT)
Dept: RADIOLOGY | Facility: MEDICAL CENTER | Age: 34
End: 2024-03-04
Attending: STUDENT IN AN ORGANIZED HEALTH CARE EDUCATION/TRAINING PROGRAM
Payer: COMMERCIAL

## 2024-03-06 ENCOUNTER — HOSPITAL ENCOUNTER (OUTPATIENT)
Dept: LAB | Facility: MEDICAL CENTER | Age: 34
End: 2024-03-06
Attending: STUDENT IN AN ORGANIZED HEALTH CARE EDUCATION/TRAINING PROGRAM
Payer: COMMERCIAL

## 2024-03-06 LAB
ALBUMIN SERPL BCP-MCNC: 4.1 G/DL (ref 3.2–4.9)
ALBUMIN/GLOB SERPL: 1.2 G/DL
ALP SERPL-CCNC: 46 U/L (ref 30–99)
ALT SERPL-CCNC: 11 U/L (ref 2–50)
ANION GAP SERPL CALC-SCNC: 17 MMOL/L (ref 7–16)
AST SERPL-CCNC: 18 U/L (ref 12–45)
BILIRUB SERPL-MCNC: 0.4 MG/DL (ref 0.1–1.5)
BUN SERPL-MCNC: 11 MG/DL (ref 8–22)
CALCIUM ALBUM COR SERPL-MCNC: 9.2 MG/DL (ref 8.5–10.5)
CALCIUM SERPL-MCNC: 9.3 MG/DL (ref 8.5–10.5)
CHLORIDE SERPL-SCNC: 104 MMOL/L (ref 96–112)
CHOLEST SERPL-MCNC: 156 MG/DL (ref 100–199)
CO2 SERPL-SCNC: 21 MMOL/L (ref 20–33)
CREAT SERPL-MCNC: 0.84 MG/DL (ref 0.5–1.4)
FASTING STATUS PATIENT QL REPORTED: NORMAL
GFR SERPLBLD CREATININE-BSD FMLA CKD-EPI: 94 ML/MIN/1.73 M 2
GLOBULIN SER CALC-MCNC: 3.5 G/DL (ref 1.9–3.5)
GLUCOSE SERPL-MCNC: 67 MG/DL (ref 65–99)
HDLC SERPL-MCNC: 39 MG/DL
LDLC SERPL CALC-MCNC: 93 MG/DL
POTASSIUM SERPL-SCNC: 4 MMOL/L (ref 3.6–5.5)
PROT SERPL-MCNC: 7.6 G/DL (ref 6–8.2)
SODIUM SERPL-SCNC: 142 MMOL/L (ref 135–145)
TRIGL SERPL-MCNC: 118 MG/DL (ref 0–149)
TSH SERPL DL<=0.005 MIU/L-ACNC: 3.29 UIU/ML (ref 0.38–5.33)

## 2024-03-06 PROCEDURE — 80053 COMPREHEN METABOLIC PANEL: CPT

## 2024-03-06 PROCEDURE — 36415 COLL VENOUS BLD VENIPUNCTURE: CPT

## 2024-03-06 PROCEDURE — 80061 LIPID PANEL: CPT

## 2024-03-06 PROCEDURE — 84443 ASSAY THYROID STIM HORMONE: CPT

## 2024-03-10 DIAGNOSIS — H69.92 EUSTACHIAN TUBE DYSFUNCTION, LEFT: ICD-10-CM

## 2024-03-11 RX ORDER — FLUTICASONE PROPIONATE 50 MCG
1 SPRAY, SUSPENSION (ML) NASAL DAILY
Qty: 48 ML | Refills: 1 | Status: SHIPPED | OUTPATIENT
Start: 2024-03-11

## 2024-03-11 NOTE — TELEPHONE ENCOUNTER
Received request via: Pharmacy    Was the patient seen in the last year in this department? Yes    Does the patient have an active prescription (recently filled or refills available) for medication(s) requested? No    Pharmacy Name: [facility]    Does the patient have halfway Plus and need 100 day supply (blood pressure, diabetes and cholesterol meds only)? Patient does not have SCP

## 2024-03-25 ENCOUNTER — APPOINTMENT (OUTPATIENT)
Dept: RADIOLOGY | Facility: MEDICAL CENTER | Age: 34
End: 2024-03-25
Attending: STUDENT IN AN ORGANIZED HEALTH CARE EDUCATION/TRAINING PROGRAM
Payer: COMMERCIAL

## 2024-03-25 DIAGNOSIS — H93.12 TINNITUS OF LEFT EAR: ICD-10-CM

## 2024-03-25 PROCEDURE — 70551 MRI BRAIN STEM W/O DYE: CPT

## 2024-04-01 ENCOUNTER — TELEPHONE (OUTPATIENT)
Dept: MEDICAL GROUP | Facility: CLINIC | Age: 34
End: 2024-04-01
Payer: COMMERCIAL

## 2024-04-01 NOTE — TELEPHONE ENCOUNTER
Caller Name: Francois Quintero   Call Back Number: [phone]     How would the patient prefer to be contacted with a response: Yeni message    Patient  would like a response or a call from Dr. Rolle. She has questions that she would like answered about her MRI. She had left voicemails and has not received any answers. Please advise.   I let her know that I would zelda this message urgent and sent to MD and his medical assistant.

## 2024-04-04 DIAGNOSIS — H69.92 EUSTACHIAN TUBE DYSFUNCTION, LEFT: ICD-10-CM

## 2024-04-04 RX ORDER — CETIRIZINE HYDROCHLORIDE 10 MG/1
10 TABLET ORAL DAILY
Qty: 30 TABLET | Refills: 2 | Status: SHIPPED | OUTPATIENT
Start: 2024-04-04

## 2024-04-04 RX ORDER — FLUTICASONE PROPIONATE 50 MCG
1 SPRAY, SUSPENSION (ML) NASAL DAILY
Qty: 48 ML | Refills: 1 | Status: SHIPPED | OUTPATIENT
Start: 2024-04-04

## 2024-05-03 ENCOUNTER — HOSPITAL ENCOUNTER (OUTPATIENT)
Dept: LAB | Facility: MEDICAL CENTER | Age: 34
End: 2024-05-03
Attending: ALLERGY & IMMUNOLOGY
Payer: COMMERCIAL

## 2024-05-05 LAB
DEPRECATED MISC ALLERGEN IGE RAST QL: NORMAL
EGG WHITE IGE QN: 8.82 KU/L
EGG YOLK IGE QN: 4.4 KU/L
TEST NAME 95000: ABNORMAL

## 2024-05-09 DIAGNOSIS — H69.92 EUSTACHIAN TUBE DYSFUNCTION, LEFT: ICD-10-CM

## 2024-05-09 RX ORDER — CETIRIZINE HYDROCHLORIDE 10 MG/1
10 TABLET ORAL DAILY
Qty: 90 TABLET | Refills: 1 | Status: SHIPPED | OUTPATIENT
Start: 2024-05-09

## 2024-05-09 NOTE — TELEPHONE ENCOUNTER
Received request via: Pharmacy    Was the patient seen in the last year in this department? Yes    Does the patient have an active prescription (recently filled or refills available) for medication(s) requested? No    Pharmacy Name: [facility]

## 2024-05-21 ENCOUNTER — HOSPITAL ENCOUNTER (OUTPATIENT)
Dept: LAB | Facility: MEDICAL CENTER | Age: 34
End: 2024-05-21
Attending: OBSTETRICS & GYNECOLOGY
Payer: COMMERCIAL

## 2024-05-22 LAB — B-HCG SERPL-ACNC: 5378 MIU/ML (ref 0–5)

## 2024-05-23 ENCOUNTER — HOSPITAL ENCOUNTER (OUTPATIENT)
Dept: LAB | Facility: MEDICAL CENTER | Age: 34
End: 2024-05-23
Attending: OBSTETRICS & GYNECOLOGY
Payer: COMMERCIAL

## 2024-05-23 LAB — B-HCG SERPL-ACNC: 8469 MIU/ML (ref 0–5)

## 2024-06-11 ENCOUNTER — HOSPITAL ENCOUNTER (OUTPATIENT)
Dept: LAB | Facility: MEDICAL CENTER | Age: 34
End: 2024-06-11
Attending: OBSTETRICS & GYNECOLOGY
Payer: COMMERCIAL

## 2024-06-11 LAB
IRON SATN MFR SERPL: 41 % (ref 15–55)
IRON SERPL-MCNC: 122 UG/DL (ref 40–170)
TIBC SERPL-MCNC: 297 UG/DL (ref 250–450)
UIBC SERPL-MCNC: 175 UG/DL (ref 110–370)

## 2024-06-11 PROCEDURE — 83550 IRON BINDING TEST: CPT

## 2024-06-11 PROCEDURE — 83540 ASSAY OF IRON: CPT

## 2024-06-11 PROCEDURE — 36415 COLL VENOUS BLD VENIPUNCTURE: CPT

## 2024-07-30 ENCOUNTER — HOSPITAL ENCOUNTER (EMERGENCY)
Facility: MEDICAL CENTER | Age: 34
End: 2024-07-30
Attending: EMERGENCY MEDICINE
Payer: COMMERCIAL

## 2024-07-30 VITALS
HEIGHT: 66 IN | WEIGHT: 206.79 LBS | TEMPERATURE: 97.7 F | HEART RATE: 100 BPM | SYSTOLIC BLOOD PRESSURE: 117 MMHG | DIASTOLIC BLOOD PRESSURE: 78 MMHG | RESPIRATION RATE: 20 BRPM | BODY MASS INDEX: 33.23 KG/M2 | OXYGEN SATURATION: 97 %

## 2024-07-30 DIAGNOSIS — S46.012A TRAUMATIC INCOMPLETE TEAR OF LEFT ROTATOR CUFF, INITIAL ENCOUNTER: ICD-10-CM

## 2024-07-30 PROCEDURE — A9270 NON-COVERED ITEM OR SERVICE: HCPCS | Performed by: EMERGENCY MEDICINE

## 2024-07-30 PROCEDURE — 99283 EMERGENCY DEPT VISIT LOW MDM: CPT

## 2024-07-30 PROCEDURE — 700102 HCHG RX REV CODE 250 W/ 637 OVERRIDE(OP): Performed by: EMERGENCY MEDICINE

## 2024-07-30 RX ORDER — HYDROCODONE BITARTRATE AND ACETAMINOPHEN 5; 325 MG/1; MG/1
1 TABLET ORAL ONCE
Status: COMPLETED | OUTPATIENT
Start: 2024-07-30 | End: 2024-07-30

## 2024-07-30 RX ORDER — HYDROCODONE BITARTRATE AND ACETAMINOPHEN 5; 325 MG/1; MG/1
1 TABLET ORAL EVERY 6 HOURS PRN
Qty: 6 TABLET | Refills: 0 | Status: SHIPPED | OUTPATIENT
Start: 2024-07-30 | End: 2024-08-02

## 2024-07-30 RX ADMIN — HYDROCODONE BITARTRATE AND ACETAMINOPHEN 1 TABLET: 5; 325 TABLET ORAL at 15:07

## 2024-07-30 ASSESSMENT — FIBROSIS 4 INDEX: FIB4 SCORE: 0.5

## 2024-07-30 NOTE — ED TRIAGE NOTES
"Chief Complaint   Patient presents with    Arm Pain     Pt states she believes there is a pinch nerve in her left shoulder. Severe pain developed 3 days ago and is now radiating down left arm. Able to move neck. Mild numbness and tinging in left fingers.   Pt 4 months pregnant and can't control pain with Tylenol.      /78   Pulse 100   Temp 36.5 °C (97.7 °F) (Temporal)   Resp 20   Ht 1.676 m (5' 6\")   Wt 93.8 kg (206 lb 12.7 oz)   LMP 05/05/2024 (Exact Date) Comment: Currently pregnent  SpO2 97%   BMI 33.38 kg/m²     "

## 2024-07-30 NOTE — ED NOTES
Reviewed discharge instructions and prescription x 1 sent to selected pharmacy w/ pt, verbalized understanding to information provided including follow up care w/ Ortho, return precautions and medication precautions.  Addressed further questions r/t medications and shoulder care, denied further questions/concerns.  Narcotic consent signed and placed on chart.  Pt ambulated from ED, stated visitor waiting to provide ride home.

## 2024-07-30 NOTE — ED NOTES
ERP at bedside. Pt agrees with plan of care discussed by ERP. Max in low position, side rail up for pt safety. Call light within reach. Plan of care on-going

## 2024-07-30 NOTE — ED PROVIDER NOTES
ED Provider Note    CHIEF COMPLAINT  Chief Complaint   Patient presents with    Arm Pain     Pt states she believes there is a pinch nerve in her left shoulder. Severe pain developed 3 days ago and is now radiating down left arm. Able to move neck. Mild numbness and tinging in left fingers.   Pt 4 months pregnant and can't control pain with Tylenol.        EXTERNAL RECORDS REVIEWED      HPI/ROS    LIMITATION TO HISTORY       OUTSIDE HISTORIAN(S):      Francois Quintero is a 34 y.o. female who presents emergency department chief complaint of left shoulder pain.  Patient believes she has a pinched nerve in the shoulder.  She was having some minor pain in this area and then 2 days prior she reports aggressively massaging this area with pulsed massage gun.  She states that she woke up this morning was having a difficult time lifting the arm due to severe pain in that area.  She also felt a knot in the area of pain.  She denies any known trauma to the area she has no numbness and tingling her hand but does have pain that occasionally shoots down the anterior aspect of the bicep.  No neck pain no headache no altered mental status no chest pain patient is approximately 4 months pregnant.    PAST MEDICAL HISTORY   has a past medical history of Asthma, SVT (supraventricular tachycardia) (HCC), and Thalassemia.    SURGICAL HISTORY  patient denies any surgical history    FAMILY HISTORY  Family History   Problem Relation Age of Onset    Cancer Mother         skin,ovarian    Cancer Paternal Uncle     Cancer Maternal Grandmother         breast       SOCIAL HISTORY  Social History     Tobacco Use    Smoking status: Former     Types: Cigarettes    Smokeless tobacco: Never   Vaping Use    Vaping status: Never Used   Substance and Sexual Activity    Alcohol use: No    Drug use: No    Sexual activity: Not on file       CURRENT MEDICATIONS  Home Medications    **Home medications have not yet been reviewed for this encounter**    "      ALLERGIES  No Known Allergies    PHYSICAL EXAM  VITAL SIGNS: /78   Pulse 100   Temp 36.5 °C (97.7 °F) (Temporal)   Resp 20   Ht 1.676 m (5' 6\")   Wt 93.8 kg (206 lb 12.7 oz)   LMP 05/05/2024 (Exact Date) Comment: Currently pregnent  SpO2 97%   BMI 33.38 kg/m²    Pulse ox interpretation: I interpret this pulse ox as normal.  Constitutional: Alert and oriented x 3, minimal Distress  HEENT: Atraumatic normocephalic, pupils are equal round reactive to light extraocular movements are intact. The nares is clear, external ears are normal, mouth shows moist mucous membranes normal dentition for age  Neck: Supple, no JVD no tracheal deviation  Cardiovascular: Regular rate and rhythm  Thorax & Lungs: No respiratory distress  GI: Soft nontender nondistended positive bowel sounds, no peritoneal signs  Skin: Warm dry no acute rash or lesion  Musculoskeletal: Moving all extremities with full range and 5 of 5 strength no acute  deformity  Neurologic: Cranial nerves III through XII are grossly intact no sensory deficit no cerebellar dysfunction   Psychiatric: Appropriate affect for situation at this time        RADIOLOGY/PROCEDURES   I have independently interpreted the diagnostic imaging associated with this visit and am waiting the final reading from the radiologist.   My preliminary interpretation is as follows:     Point of Care Ultrasound    ED POINT OF CARE ULTRASOUND: LIMITED musculoskeletal    Indication: Swelling and Pain  Procedure: A limited ultrasound of the patients right shoulder was performed at the area of clinical concern as noted.     There is no evidence of joint effusion, there is evidence of rhinitis/inflammation in the biceps tendon as well as the tendon sheath of the supraspinatus.  As imaged below.  No other abnormal fluid collections or other concerns.    Image retained through [facility] as seen below:       Additional interpretation: Evidence of likely partial tear supraspinatus " tendon    This study is a limited ultrasound examination performed and interpreted to evaluate for limited conditions as outlined above. There may be other clinically important information contained in the images that is outside this scope. When clinically warranted, a comprehensive ultrasound through the appropriate department is considered.            COURSE & MEDICAL DECISION MAKING    ASSESSMENT, COURSE AND PLAN  Care Narrative: Very pleasant 34-year-old female who is having some shoulder pain a couple days ago and then was aggressively massaging with pulse massage gun.  Likely exacerbated inflammation in this area with a massage gun and now has evidence of fluid within the tendon sheath as above.  Patient is pregnant at this time I see no benefit from x-rays she has had no discernible trauma no concern for fracture.  Not amenable to CAT scan as she is 4 months pregnant.  Limited medication options she was given 1 dose of Norco here and she will be given a very limited prescription for Norco to be taken as an outpatient.  She is given instructions to return for worsening pain numbness tingling weakness any other acute symptom change or concern should otherwise be referred to orthopedics for ongoing management discharged in stable and improved condition.    Prescription monitoring program queried and unremarkable.  Patient counseled on the risks of controlled substances including potential risks and benefits proper use alternative treatments, cause the symptoms, provisions of treatment plan, risk of dependence addiction and overdose method safely dispose of the medication, the fact that they would be given no refills from the emergency department.     In prescribing controlled substances to this patient, I certify that I have obtained and reviewed the medical history of Francois Quintero. I have also made a good claudio effort to obtain applicable records from other providers who have treated the patient and  "records did not demonstrate any increased risk of substance abuse that would prevent me from prescribing controlled substances.     I have conducted a physical exam and documented it. I have reviewed Ms. Quintero’s prescription history as maintained by the [place] Prescription Monitoring Program.     I have assessed the patient’s risk for abuse, dependency, and addiction using the validated Opioid Risk Tool available at https://www.mdcalc.com/yygwun-hhhh-zkmo-ort-narcotic-abuse.     Given the above, I believe the benefits of controlled substance therapy outweigh the risks. The reasons for prescribing controlled substances include non-narcotic, oral analgesic alternatives have been inadequate for pain control. Accordingly, I have discussed the risk and benefits, treatment plan, and alternative therapies with the patient.               ADDITIONAL PROBLEMS MANAGED    DISPOSITION AND DISCUSSIONS  I have discussed management of the patient with the following physicians and МАРИНА's:      Discussion of management with other QHP or appropriate source(s):      Escalation of care considered, and ultimately not performed:    Barriers to care at this time, including but not limited to: .     Decision tools and prescription drugs considered including, but not limited to: .  /78   Pulse 100   Temp 36.5 °C (97.7 °F) (Temporal)   Resp 20   Ht 1.676 m (5' 6\")   Wt 93.8 kg (206 lb 12.7 oz)   LMP 05/05/2024 (Exact Date) Comment: Currently pregnent  SpO2 97%   BMI 33.38 kg/m²     Sawyer Pandey M.D.  [address]  [address]  [facility] [id number] [phone]    Schedule an appointment as soon as possible for a visit       [facility], Emergency Dept  [address]  [facility] [id number] [phone]    in 12-24 hours if symptoms persist, immediately If symptoms worsen, or if you develop any other symptoms or concerns      FINAL DIAGNOSIS  1. Traumatic incomplete tear of left rotator " cuff, initial encounter Active        Electronically signed by: Geovanni Ramírez M.D.